# Patient Record
Sex: MALE | Race: WHITE | NOT HISPANIC OR LATINO | Employment: FULL TIME | URBAN - METROPOLITAN AREA
[De-identification: names, ages, dates, MRNs, and addresses within clinical notes are randomized per-mention and may not be internally consistent; named-entity substitution may affect disease eponyms.]

---

## 2019-03-26 ENCOUNTER — PRE VISIT (OUTPATIENT)
Dept: SLEEP MEDICINE | Facility: CLINIC | Age: 39
End: 2019-03-26

## 2019-03-26 NOTE — TELEPHONE ENCOUNTER
"  1.  Reason for the visit:  Consult to discuss snoring  2.  Referring provider and clinic name:  Self  3.  Previous Sleep Doctor or Pulmonlogist (clinic name)?  No records   4.  Records, Procedures, Imaging, and Labs (see below)  No notes        All NOTES from previous office visits that pertain to why they are being seen in the Sleep Center    Previous Sleep Studies, Chest CT, Echos and reports that pertain to why they are seeing Sleep Center    All Sleep records that have been done in the last 2 years that pertain to why they are seeing Sleep Center            Are they being seen for continuation of care for Cpap/Bipap/Avap/Trilogy/Dental Device? none    If yes to above Who and Where was Device issued/currently getting supplies from? na    Are you currently on \"Supplemental Oxygen\" during the day or night?   na                                                                                                                                                      Please remind pt to bring Cpap machine and ask to arrive 15 minutes early to appointment due traffic and congestion                                                 5. Pt Sleep Center Packet received Message left asking pt to arrive 30 minutes early to appointment if no packet received.        Yes: \"please make sure that you bring this to your appointment completed, either the doctor will not see you until this completed or you may be asked to reschedule your appointment.\"     No: mail or email to the pt and explain, \"please make sure that you bring this to your appointment completed, either the doctor will not see you until this completed or you may be asked to reschedule your appointment.\"     ~If pt coming early to fill packet out, ask that they come 30 minutes prior to their appointment~     6. Has the pt's medication list been updated and preferred pharmacy added?     7. Has the allergy list been reviewed?    \"Thank you for choosing [facility] " "and we look forward to seeing you at your upcoming appointment\"     "

## 2019-03-27 NOTE — PROGRESS NOTES
"  1.  Reason for the visit:  Consult to discuss snoring  2.  Referring provider and clinic name:  Self  3.  Previous Sleep Doctor or Pulmonlogist (clinic name)?  No records   4.  Records, Procedures, Imaging, and Labs (see below)  No notes        All NOTES from previous office visits that pertain to why they are being seen in the Sleep Center    Previous Sleep Studies, Chest CT, Echos and reports that pertain to why they are seeing Sleep Center    All Sleep records that have been done in the last 2 years that pertain to why they are seeing Sleep Center            Are they being seen for continuation of care for Cpap/Bipap/Avap/Trilogy/Dental Device? none    If yes to above Who and Where was Device issued/currently getting supplies from? na    Are you currently on \"Supplemental Oxygen\" during the day or night?   na                                                                                                                                                      Please remind pt to bring Cpap machine and ask to arrive 15 minutes early to appointment due traffic and congestion                                                 5. Pt Sleep Center Packet received Message left asking pt to arrive 30 minutes early to appointment if no packet received.        Yes: \"please make sure that you bring this to your appointment completed, either the doctor will not see you until this completed or you may be asked to reschedule your appointment.\"     No: mail or email to the pt and explain, \"please make sure that you bring this to your appointment completed, either the doctor will not see you until this completed or you may be asked to reschedule your appointment.\"     ~If pt coming early to fill packet out, ask that they come 30 minutes prior to their appointment~     6. Has the pt's medication list been updated and preferred pharmacy added?     7. Has the allergy list been reviewed?    \"Thank you for choosing [facility] " "and we look forward to seeing you at your upcoming appointment\"     CC:***    HPI:Symptoms:***        Schedule:***      Studies:***            Personal, social and Family hx:  ...Scanned in sleep consult note reviewed:    ROS      Allergies:    No Known Allergies    Medications:    No current outpatient medications on file.       Problem List:  There are no active problems to display for this patient.       Past Medical/Surgical History:  No past medical history on file.  No past surgical history on file.    No family history on file.  Social History     Socioeconomic History     Marital status:      Spouse name: Not on file     Number of children: Not on file     Years of education: Not on file     Highest education level: Not on file   Occupational History     Not on file   Social Needs     Financial resource strain: Not on file     Food insecurity:     Worry: Not on file     Inability: Not on file     Transportation needs:     Medical: Not on file     Non-medical: Not on file   Tobacco Use     Smoking status: Not on file   Substance and Sexual Activity     Alcohol use: Not on file     Drug use: Not on file     Sexual activity: Not on file   Lifestyle     Physical activity:     Days per week: Not on file     Minutes per session: Not on file     Stress: Not on file   Relationships     Social connections:     Talks on phone: Not on file     Gets together: Not on file     Attends [employer] service: Not on file     Active member of club or organization: Not on file     Attends meetings of clubs or organizations: Not on file     Relationship status: Not on file     Intimate partner violence:     Fear of current or ex partner: Not on file     Emotionally abused: Not on file     Physically abused: Not on file     Forced sexual activity: Not on file   Other Topics Concern     Not on file   Social History Narrative     Not on file       Physical Examination:  Vitals: /78   Pulse 59   Resp 16   Ht 1.727 m " "(5' 8\")   Wt 81.6 kg (180 lb)   SpO2 97%   BMI 27.37 kg/m    BMI= Body mass index is 27.37 kg/m .    Neck Cir (cm): 43 cm    [place] Total Score 3/28/2019   Total score - [place] 18   mal iv, geovanna bilat, nht, dec rul, mild cy on nails nare geovanna    {SHORT EXAM:248676}  Musculoskeletal:  Mallampati Class: {KAI NUMERAL I-IV:269192}.  Tonsillar Stage: {NUMBERS 1-4:248278}.  Dental:         Assessment/Plan:***    1.***  2. ***  3.  4.  5.        CC: No ref. provider found                      "

## 2019-03-28 ENCOUNTER — OFFICE VISIT (OUTPATIENT)
Dept: SLEEP MEDICINE | Facility: CLINIC | Age: 39
End: 2019-03-28
Payer: COMMERCIAL

## 2019-03-28 VITALS
DIASTOLIC BLOOD PRESSURE: 78 MMHG | HEIGHT: 68 IN | OXYGEN SATURATION: 97 % | SYSTOLIC BLOOD PRESSURE: 105 MMHG | RESPIRATION RATE: 16 BRPM | WEIGHT: 180 LBS | BODY MASS INDEX: 27.28 KG/M2 | HEART RATE: 59 BPM

## 2019-03-28 DIAGNOSIS — G47.21 CIRCADIAN RHYTHM SLEEP DISORDER, DELAYED SLEEP PHASE TYPE: ICD-10-CM

## 2019-03-28 DIAGNOSIS — G47.8 UNHEALTHY SLEEP HABIT: ICD-10-CM

## 2019-03-28 DIAGNOSIS — R06.83 SNORING: Primary | ICD-10-CM

## 2019-03-28 PROCEDURE — 99205 OFFICE O/P NEW HI 60 MIN: CPT | Performed by: NURSE PRACTITIONER

## 2019-03-28 ASSESSMENT — MIFFLIN-ST. JEOR: SCORE: 1705.97

## 2019-03-28 NOTE — PATIENT INSTRUCTIONS
"MY TREATMENT INFORMATION FOR SLEEP APNEA-  Popeye Garcia    NP: Olivia Leon  SLEEP CENTER : [facility]     MY CONTACT NUMBER: [phone]    Call insurance: ask cost for a home study for snoring and suspected sleep apnea  Call our price line and as same question as above  Contact us [phone] or [phone] for me with decision as to whether to go  Rise time: reduce variance to < 3 hrs throughout the week  Alcohol: not within 3-4 hrs of bedtime  flonase 2 sprays/nostril/day    Am I having a sleep study at a sleep center?  Make sure you have an appointment for the study before you leave!    Am I having a home sleep study?  Watch this video:  https://[email or web address].com/watch?v=CteI_[email or web address]&[email or web address]  Please verify your insurance coverage with your insurance carrier    Frequently asked questions:  1. What is Obstructive Sleep Apnea (ACE)? ACE is the most common type of sleep apnea. Apnea means, \"without breath.\"  Apnea is most often caused by narrowing or collapse of the upper airway as muscles relax during sleep.   Almost everyone has occasional apneas. Most people with sleep apnea have had brief interruptions at night frequently for many years.  The severity of sleep apnea is related to how frequent and severe the events are.   2. What are the consequences of ACE? Symptoms include: feeling sleepy during the day, snoring loudly, gasping or stopping of breathing, trouble sleeping, and occasionally morning headaches or heartburn at night.  Sleepiness can be serious and even increase the risk of falling asleep while driving. Other health consequences may include development of high blood pressure and other cardiovascular disease in persons who are susceptible. Untreated ACE  can contribute to heart disease, stroke and diabetes.   3. What are the treatment options? In most situations, sleep apnea is a lifelong disease that must be managed with daily therapy. Medications are " not effective for sleep apnea and surgery is generally not considered until other therapies have been tried. Your treatment is your choice . Continuous Positive Airway (CPAP) works right away and is the therapy that is effective in nearly everyone. An oral device to hold your jaw forward is usually the next most reliable option. Other options include postioning devices (to keep you off your back), weight loss, and surgery including a tongue pacing device. There is more detail about some of these options below.    Important tips for using CPAP and similar devices   Know your equipment:  CPAP is continuous positive airway pressure that prevents obstructive sleep apnea by keeping the throat from collapsing while you are sleeping. In most cases, the device is  smart  and can slowly self-adjusts if your throat collapses and keeps a record every day of how well you are treated-this information is available to you and your care team.  BPAP is bilevel positive airway pressure that keeps your throat open and also assists each breath with a pressure boost to maintain adequate breathing.  Special kinds of BPAP are used in patients who have inadequate breathing from lung or heart disease. In most cases, the device is  smart  and can slowly self-adjusts to assist breathing. Like CPAP, the device keeps a record of how well you are treated.  Your mask is your connection to the device. You get to choose what feels most comfortable and the staff will help to make sure if fits. Here: are some examples of the different masks that are available:       Key points to remember on your journey with sleep apnea:  1. Sleep study.  PAP devices often need to be adjusted during a sleep study to show that they are effective and adjusted right.  2. Good tips to remember: Try wearing just the mask during a quiet time during the day so your body adapts to wearing it. A humidifier is recommended for comfort in most cases to prevent drying of your  nose and throat. Allergy medication from your provider may help you if you are having nasal congestion.  3. Getting settled-in. It takes more than one night for most of us to get used to wearing a mask. Try wearing just the mask during a quiet time during the day so your body adapts to wearing it. A humidifier is recommended for comfort in most cases. Our team will work with you carefully on the first day and will be in contact within 4 days and again at 2 and 4 weeks for advice and remote device adjustments. Your therapy is evaluated by the device each day.   4. Use it every night. The more you are able to sleep naturally for 7-8 hours, the more likely you will have good sleep and to prevent health risks or symptoms from sleep apnea. Even if you use it 4 hours it helps. Occasionally all of us are unable to use a medical therapy, in sleep apnea, it is not dangerous to miss one night.   5. Communicate. Call our skilled team on the number provided on the first day if your visit for problems that make it difficult to wear the device. Over 2 out of 3 patients can learn to wear the device long-term with help from our team. Remember to call our team or your sleep providers if you are unable to wear the device as we may have other solutions for those who cannot adapt to mask CPAP therapy. It is recommended that you sleep your sleep provider within the first 3 months and yearly after that if you are not having problems.   Take care of your equipment. Make sure you clean your mask and tubing using directions every day and that your filter and mask are replaced as recommended or if they are not working.     BESIDES CPAP, WHAT OTHER THERAPIES ARE THERE?    Positioning Device  Positioning devices are generally used when sleep apnea is mild and only occurs on your back.This example shows a pillow that straps around the waist. It may be appropriate for those whose sleep study shows milder sleep apnea that occurs primarily when  lying flat on one's back. Preliminary studies have shown benefit but effectiveness at home may need to be verified by a home sleep test. These devices are generally not covered by medical insurance.  Examples of devices that maintain sleeping on the back to prevent snoring and mild sleep apnea.    Positioning Device  Positioning devices are generally used when sleep apnea is mild and only occurs on your back.This example shows a pillow that straps around the waist. It may be appropriate for those whose sleep study shows milder sleep apnea that occurs primarily when lying flat on one's back. Preliminary studies have shown benefit but effectiveness at home may need to be verified by a home sleep test. These devices are generally not covered by medical insurance.       [employer] or [employer] for slumber bump pillow, or backpack w/ chest strap stuffed w/ pillow;  or t shirt w/2 pockets, sew in tennis balls    Belt type body positioner  Http://[employer]/    Electronic reminder  Http://nightshifttherapy.com/  Http://www.ENDOGENX.Veritext.au/    Oral Appliance  What is oral appliance therapy?  An oral appliance device fits on your teeth at night like a retainer used after having braces. The device is made by a specialized dentist and requires several visits over 1-2 months before a manufactured device is made to fit your teeth and is adjusted to prevent your sleep apnea. Once an oral device is working properly, snoring should be improved. A home sleep test may be recommended at that time if to determine whether the sleep apnea is adequately treated.       Some things to remember:  -Oral devices are often, but not always, covered by your medical insurance. Be sure to check with your insurance provider.   -If you are referred for oral therapy, you will be given a list of specialized dentists to consider or you may choose to visit the Web site of the American Academy of Dental Sleep Medicine  -Oral devices are less likely to work if you  have severe sleep apnea or are extremely overweight.     More detailed information  An oral appliance is a small acrylic device that fits over the upper and lower teeth  (similar to a retainer or a mouth guard). This device slightly moves jaw forward, which moves the base of the tongue forward, opens the airway, improves breathing for effective treat snoring and obstructive sleep apnea in perhaps 7 out of 10 people .  The best working devices are custom-made by a dental device  after a mold is made of the teeth 1, 2, 3.  When is an oral appliance indicated?  Oral appliance therapy is recommended as a first-line treatment for patients with primary snoring, mild sleep apnea, and for patients with moderate sleep apnea who prefer appliance therapy to use of CPAP4, 5. Severity of sleep apnea is determined by sleep testing and is based on the number of respiratory events per hour of sleep.   How successful is oral appliance therapy?  The success rate of oral appliance therapy in patients with mild sleep apnea is 75-80% while in patients with moderate sleep apnea it is 50-70%. The chance of success in patients with severe sleep apnea is 40-50%. The research also shows that oral appliances have a beneficial effect on the cardiovascular health of ACE patients at the same magnitude as CPAP therapy7.  Oral appliances should be a second-line treatment in cases of severe sleep apnea, but if not completely successful then a combination therapy utilizing CPAP plus oral appliance therapy may be effective. Oral appliances tend to be effective in a broad range of patients although studies show that the patients who have the highest success are females, younger patients, those with milder disease, and less severe obesity. 3, 6.   Finding a dentist that practices dental sleep medicine  Specific training is available through the American Academy of Dental Sleep Medicine for dentists interested in working in the field of  sleep. To find a dentist who is educated in the field of sleep and the use of oral appliances, near you, visit the Web site of the American Academy of Dental Sleep Medicine.    References  1. Sree et al. Objectively measured vs self-reported compliance during oral appliance therapy for sleep-disordered breathing. Chest 2013; 144(5): 6592-9686.  2. Delfino et al. Objective measurement of compliance during oral appliance therapy for sleep-disordered breathing. Thorax 2013; 68(1): 91-96.  3. Sy et al. Mandibular advancement devices in 620 men and women with ACE and snoring: tolerability and predictors of treatment success. Chest 2004; 125: 8208-7761.  4. Juan M et al. Oral appliances for snoring and ACE: a review. Sleep 2006; 29: 244-262.  5. Sammi et al. Oral appliance treatment for ACE: an update. J Clin Sleep Med 2014; 10(2): 215-227.  6. Jayda et al. Predictors of OSAH treatment outcome. J Dent Res 2007; 86: 5215-5494.      Weight Loss:    Weight loss is a long-term strategy that may improve sleep apnea in some patients.    Weight management is a personal decision and the decision should be based on your interest and the potential benefits.  If you are interested in exploring weight loss strategies, the following discussion covers the impact on weight loss on sleep apnea and the approaches that may be successful.    Being overweight does not necessarily mean you will have health consequences.  Those who have BMI over 35 or over 27 with existing medical conditions carries greater risk.   Weight loss decreases severity of sleep apnea in most people with obesity. For those with mild obesity who have developed snoring with weight gain, even 15-30 pound weight loss can improve and occasionally eliminate sleep apnea.  Structured and life-long dietary and health habits are necessary to lose weight and keep healthier weight levels.     Though there may be significant health benefits from  weight loss, long-term weight loss is very difficult to achieve- studies show success with dietary management in less than 10% of people. In addition, substantial weight loss may require years of dietary control and may be difficult if patients have severe obesity. In these cases, surgical management may be considered.  Finally, older individuals who have tolerated obesity without health complications may be less likely to benefit from weight loss strategies.      [unfilled]    Surgery:    Surgery for obstructive sleep apnea is considered generally only when other therapies fail to work. Surgery may be discussed with you if you are having a difficult time tolerating CPAP and or when there is an abnormal structure that requires surgical correction.  Nose and throat surgeries often enlarge the airway to prevent collapse.  Most of these surgeries create pain for 1-2 weeks and up to half of the most common surgeries are not effective throughout life.  You should carefully discuss the benefits and drawbacks to surgery with your sleep provider and surgeon to determine if it is the best solution for you.   More information  Surgery for ACE is directed at areas that are responsible for narrowing or complete obstruction of the airway during sleep.  There are a wide range of procedures available to enlarge and/or stabilize the airway to prevent blockage of breathing in the three major areas where it can occur: the palate, tongue, and nasal regions.  Successful surgical treatment depends on the accurate identification of the factors responsible for obstructive sleep apnea in each person.  A personalized approach is required because there is no single treatment that works well for everyone.  Because of anatomic variation, consultation with an examination by a sleep surgeon is a critical first step in determining what surgical options are best for each patient.  In some cases, examination during sedation may be recommended in  order to guide the selection of procedures.  Patients will be counseled about risks and benefits as well as the typical recovery course after surgery. Surgery is typically not a cure for a person s ACE.  However, surgery will often significantly improve one s ACE severity (termed  success rate ).  Even in the absence of a cure, surgery will decrease the cardiovascular risk associated with OSA7; improve overall quality of life8 (sleepiness, functionality, sleep quality, etc).      Palate Procedures:  Patients with ACE often have narrowing of their airway in the region of their tonsils and uvula.  The goals of palate procedures are to widen the airway in this region as well as to help the tissues resist collapse.  Modern palate procedure techniques focus on tissue conservation and soft tissue rearrangement, rather than tissue removal.  Often the uvula is preserved in this procedure. Residual sleep apnea is common in patient after pharyngoplasty with an average reduction in sleep apnea events of 33%2.      Tongue Procedures:  ExamWhile patients are awake, the muscles that surround the throat are active and keep this region open for breathing. These muscles relax during sleep, allowing the tongue and other structures to collapse and block breathing.  There are several different tongue procedures available.  Selection of a tongue base procedure depends on characteristics seen on physical exam.  Generally, procedures are aimed at removing bulky tissues in this area or preventing the back of the tongue from falling back during sleep.  Success rates for tongue surgery range from 50-62%3.    Hypoglossal Nerve Stimulation:  Hypoglossal nerve stimulation has recently received approval from the [place] Food and Drug Administration for the treatment of obstructive sleep apnea.  This is based on research showing that the system was safe and effective in treating sleep apnea6.  Results showed that the median AHI score  decreased 68%, from 29.3 to 9.0. This therapy uses an implant system that senses breathing patterns and delivers mild stimulation to airway muscles, which keeps the airway open during sleep.  The system consists of three fully implanted components: a small generator (similar in size to a pacemaker), a breathing sensor, and a stimulation lead.  Using a small handheld remote, a patient turns the therapy on before bed and off upon awakening.    Candidates for this device must be greater than 22 years of age, have moderate to severe ACE (AHI between 20-65), BMI less than 32, have tried CPAP/oral appliance without success, and have appropriate upper airway anatomy (determined by a sleep endoscopy performed by Dr. Phillips).    Hypoglossal Nerve Stimulation Pathway:    The sleep surgeon s office will work with the patient through the insurance prior-authorization process (including communications and appeals).    Nasal Procedures:  Nasal obstruction can interfere with nasal breathing during the day and night.  Studies have shown that relief of nasal obstruction can improve the ability of some patients to tolerate positive airway pressure therapy for obstructive sleep apnea1.  Treatment options include medications such as nasal saline, topical corticosteroid and antihistamine sprays, and oral medications such as antihistamines or decongestants. Non-surgical treatments can include external nasal dilators for selected patients. If these are not successful by themselves, surgery can improve the nasal airway either alone or in combination with these other options.      Combination Procedures:  Combination of surgical procedures and other treatments may be recommended, particularly if patients have more than one area of narrowing or persistent positional disease.  The success rate of combination surgery ranges from 66-80%2,3.    References  1. Leann BEEBE. The Role of the Nose in Snoring and Obstructive Sleep Apnoea: An Update.   Eur Arch Otorhinolaryngol. 2011; 268: 1365-73.  2.  Priti SM; Kelly JA; Nely JR; Pallanch JF; Juan MB; Sulaiman SG; Ayo GRAY. Surgical modifications of the upper airway for obstructive sleep apnea in adults: a systematic review and meta-analysis. SLEEP 2010;33(10):7577-5722. Jessica MCCOY. Hypopharyngeal surgery in obstructive sleep apnea: an evidence-based medicine review.  Arch Otolaryngol Head Neck Surg. 2006 Feb;132(2):206-13.  3. Champ YH1, Apollo Y, Jaen DIONE. The efficacy of anatomically based multilevel surgery for obstructive sleep apnea. Otolaryngol Head Neck Surg. 2003 Oct;129(4):327-35.  4. Kezirian E, Goldberg A. Hypopharyngeal Surgery in Obstructive Sleep Apnea: An Evidence-Based Medicine Review. Arch Otolaryngol Head Neck Surg. 2006 Feb;132(2):206-13.  5. Irvin TORRES et al. Upper-Airway Stimulation for Obstructive Sleep Apnea.  N Engl J Med. 2014 Jan 9;370(2):139-49.  6. Pebrett Y et al. Increased Incidence of Cardiovascular Disease in Middle-aged Men with Obstructive Sleep Apnea. Am J Respir Crit Care Med; 2002 166: 159-165  7. Julia EM et al. Studying Life Effects and Effectiveness of Palatopharyngoplasty (SLEEP) study: Subjective Outcomes of Isolated Uvulopalatopharyngoplasty. Otolaryngol Head Neck Surg. 2011; 144: 623-631.              Your BMI is Body mass index is 27.37 kg/m .  Weight management is a personal decision.  If you are interested in exploring weight loss strategies, the following discussion covers the approaches that may be successful. Body mass index (BMI) is one way to tell whether you are at a healthy weight, overweight, or obese. It measures your weight in relation to your height.  A BMI of 18.5 to 24.9 is in the healthy range. A person with a BMI of 25 to 29.9 is considered overweight, and someone with a BMI of 30 or greater is considered obese. More than two-thirds of American adults are considered overweight or obese.  Being overweight or obese increases the risk for further  weight gain. Excess weight may lead to heart disease and diabetes.  Creating and following plans for healthy eating and physical activity may help you improve your health.  Weight control is part of healthy lifestyle and includes exercise, emotional health, and healthy eating habits. Careful eating habits lifelong are the mainstay of weight control. Though there are significant health benefits from weight loss, long-term weight loss with diet alone may be very difficult to achieve- studies show long-term success with dietary management in less than 10% of people. Attaining a healthy weight may be especially difficult to achieve in those with severe obesity. In some cases, medications, devices and surgical management might be considered.  What can you do?  If you are overweight or obese and are interested in methods for weight loss, you should discuss this with your provider.     Consider reducing daily calorie intake by 500 calories.     Keep a food journal.     Avoiding skipping meals, consider cutting portions instead.    Diet combined with exercise helps maintain muscle while optimizing fat loss. Strength training is particularly important for building and maintaining muscle mass. Exercise helps reduce stress, increase energy, and improves fitness. Increasing exercise without diet control, however, may not burn enough calories to loose weight.       Start walking three days a week 10-20 minutes at a time    Work towards walking thirty minutes five days a week     Eventually, increase the speed of your walking for 1-2 minutes at time    In addition, we recommend that you review healthy lifestyles and methods for weight loss available through the National Institutes of Health patient information sites:  http://win.niddk.nih.gov/publications/index.htm    And look into health and wellness programs that may be available through your health insurance provider, employer, local community center, or [employer].    Weight  management plan: Patient was referred to their PCP to discuss a diet and exercise plan.

## 2019-03-30 NOTE — PROGRESS NOTES
Visit Date:   03/28/2019      CHIEF COMPLAINT:  The patient self-refers for problems with snoring and witnessed apneas.      HISTORY OF PRESENT ILLNESS:  Mr. Garcia who prefers to be called Popeye, reports a history of snoring and apneas, and at this point in time, he has become concerned about not only his health, but actually his bed partner's health as well.  He has quit smoking.  He has lost weight.  He has changed his exercise patterns from three, to five times a week.  He has decreased his alcohol intake and yet the snoring and the apneas persist.        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  He does work Mondays and Tuesdays, 10 a.m. to 4 p.m. and on Wednesday, Thursday Fridays and Saturdays, works 4 p.m. to 10 or until 1 or 2 in the morning.  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  He indicates you need to keep in mind that they do clean up for 1-2 hours, so he is usually home by approximately 2 p.m. and in bed usually between 2 and 3.  Sleep latency is 10 minutes.  He wakes up 3-4 times a night for at least 5 minutes each, sometimes longer, due to snoring.  He is uncomfortable, falls back asleep.  He gets about 8 hours of sleep.  He does nap twice a week for an hour and a half plus, and does feel better with his naps.      Activities in bed do include some brief TV or using his phone.  He does have rare episodes of RLS.  He thinks that might be when he does have alcohol close to bedtime.  He has changed that habit and things are very infrequent.  Sometimes he does move his hands at night while he is sleeping.  He does grind his teeth on occasion, and 7 nights a week, there is loud snoring heard and witnessed apneas.  He does awaken with a dry throat, trouble breathing through his nose and admits that all things are worse on his back, where he may get elbowed to turn over.  No signs of orexin deficiency.      SOCIAL, PERSONAL, FAMILY  AND SLEEP SCALE:  He is , lives with his wife.  Sleep environment is conducive to good sleep.  Family history is positive for obstructive sleep apnea in an uncle.  Alcohol intake:  May have a drink or 2 while they close at the bar, a couple nights a week.  This is down significantly from previous use.  Denies use of marijuana.  No use of over-the-counter meds to help fall asleep.  Caffeine usually 2-3 servings.  Sometimes he is done by the afternoon, but if he closes, he may be drinking a Diet Coke until he closes up.  Does exercise.      His [place] Sleepiness Scale today is 18/24 with a 0 for stopped for a few minutes in traffic.  No near accidents or accidents due to sleepiness while driving, but sleepiness affects him on the job.  He does state that his memory is somewhat impaired, the more sleepy he gets and performance certainly is down in that case.  He is unable to say if he is troubled by tiredness or fatigue.  He actually feels sleepy.  Five to 7/30 days, struggles with anxiety.      REVIEW OF SYSTEMS:  A 14-point comprehensive review of systems completed and negative with the exception of the following:   EYES:  Some changes in vision.   LUNGS:  Shortness of breath with activity, but can do 2 flights of stairs without stopping.  He is actually fairly well-conditioned.   MENTAL HEALTH:  Anxiety as mentioned.      PAST SURGICAL HISTORY:  ACL repair.      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  He has not had PFTs that he can recall.  He has fairly good activity tolerance and has worked hard to improve that.  He also has stopped smoking.       Allergies:    No Known Allergies    Medications:    No current outpatient medications on file.       Problem List:  There are no active problems to display for this patient.       Past Medical/Surgical History:      MENTAL HEALTH:  Anxiety as mentioned.     "  PAST SURGICAL HISTORY:  ACL repair.      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  He has not had PFTs that he can recall.  He has fairly good activity tolerance and has worked hard to improve that.  He also has stopped smoking.    No family history on file.  Social History     Socioeconomic History     Marital status:      Spouse name: Not on file     Number of children: Not on file     Years of education: Not on file     Highest education level: Not on file   Occupational History     Not on file   Social Needs     Financial resource strain: Not on file     Food insecurity:     Worry: Not on file     Inability: Not on file     Transportation needs:     Medical: Not on file     Non-medical: Not on file   Tobacco Use     Smoking status: Not on file   Substance and Sexual Activity     Alcohol use: Not on file     Drug use: Not on file     Sexual activity: Not on file   Lifestyle     Physical activity:     Days per week: Not on file     Minutes per session: Not on file     Stress: Not on file   Relationships     Social connections:     Talks on phone: Not on file     Gets together: Not on file     Attends Voodoo service: Not on file     Active member of club or organization: Not on file     Attends meetings of clubs or organizations: Not on file     Relationship status: Not on file     Intimate partner violence:     Fear of current or ex partner: Not on file     Emotionally abused: Not on file     Physically abused: Not on file     Forced sexual activity: Not on file   Other Topics Concern     Not on file   Social History Narrative     Not on file       Physical Examination:  Vitals: /78   Pulse 59   Resp 16   Ht 1.727 m (5' 8\")   Wt 81.6 kg (180 lb)   SpO2 97%   BMI 27.37 kg/m    BMI= Body mass index is 27.37 kg/m .    Neck Cir (cm): 43 cm    Center Total Score 3/28/2019   Total score - Center " 18    ADRI score is 12/28.    GENERAL APPEARANCE: healthy, alert and no distress  EYES: Eyes grossly normal to inspection  HENT: oropharynx crowded, soft palate dependent, tongue base enlarged and nares congested bilaterally  NECK: thyroid normal to palpation  RESP: lungs clear to auscultation with exception of RUL(danni syndrome) - no rales, rhonchi or wheezes  CV: regular rates and rhythm, normal S1 S2, no S3 or S4 and no murmur, click or rub  Extremities are without clubbing (possible mild cyanosis of nail beds), edema  Musculoskeletal:Moves without difficulty  Mallampati Class: IV.  Tonsillar Stage: 1  hidden by pillars.  Dental: Dentition in good condition.  Good advancement of lower jaw without tmd  Skin: without facial rash     ASSESSMENT AND PLAN:  It is my impression that Mr. Garcia, at his young age, does have a moderate pretest probability for obstructive sleep apnea with his symptoms of excessive daytime sleepiness, witnessed apneas or loud snoring, and impact on mental health with some anxiety and difficulty with concentrating at work.  He does have a delayed sleep phase disorder; however, he falls asleep quickly and has reduced wakeups which certainly could be even further reduced in length by eliminating naps close to the time that he is studied.  He would prefer an HST and the following plan of care has been developed:   1.  Snoring with witnessed apneas.  STOP-Bang score of 5/8 in the setting of a 38-year-old male.  I have suggested that he consider an HST and for him to call his insurance to determine cost.  He has a large copay and just wants to assure that there are adequate funds at this point in time for him, since he will likely be paying the full price until he meets his deductible.  I have also given him the number for the price line to ask the same question.  He is to contact us at our [phone] or [phone] extensions, and leave a message with his decision as to whether to go ahead and order his  study.  We have discussed all modalities of treatment and he is open to most.  With his excellent activity tolerance, I feel there is likely a low probability that there would be any impact from his Neihart's syndrome.  Certainly with doing an HST initially, should there be a need to perform an in-lab study thereafter, then I would recommend PFTs and possibly ABGs with TCM monitoring, should we suspect that there is any significant alteration in ventilation due to his condition.   2.  Delayed sleep phase disorder.  I suggested that he minimize the difference between rise time throughout the week to improve his alertness in the morning and improved sensation of restoration of alertness and feeling less tired in the a.m.   3.  Sleep habits.  I recommend that he avoid the alcohol within 3-4 hours of bedtime and certainly caffeine as well since he gets RLS if either of those are close to bedtime as well.  In general, he has made a significant improvement in his sleep habits.      Thank you for allowing me to participate in this kind man's care.  I look forward to his response following a call to insurance providers.  Time spent with patient 60 minutes, of which greater than 50% was spent in counseling, education and coordination of care.         ROME HOLT, CNP             D: 2019   T: 2019   MT: MAGALIS      Name:     YUE KERR   MRN:      [id number]        Account:      [id number]   :      1980           Visit Date:   2019      Document: [id number]

## 2019-03-31 PROBLEM — F41.9 ANXIETY: Status: ACTIVE | Noted: 2019-03-31

## 2019-03-31 PROBLEM — Q79.8 POLAND SYNDROME: Status: ACTIVE | Noted: 2019-03-31

## 2019-03-31 PROBLEM — Z98.890 S/P ACL REPAIR: Status: ACTIVE | Noted: 2019-03-31

## 2019-05-08 ENCOUNTER — TELEPHONE (OUTPATIENT)
Dept: SLEEP MEDICINE | Facility: CLINIC | Age: 39
End: 2019-05-08

## 2019-05-08 NOTE — TELEPHONE ENCOUNTER
PT called and message left asking pt to call back to schedule HST and SAUD .    APOORVA Prince

## 2019-05-17 ENCOUNTER — TELEPHONE (OUTPATIENT)
Dept: SLEEP MEDICINE | Facility: CLINIC | Age: 39
End: 2019-05-17

## 2019-06-17 ENCOUNTER — TELEPHONE (OUTPATIENT)
Dept: SLEEP MEDICINE | Facility: CLINIC | Age: 39
End: 2019-06-17

## 2019-06-17 NOTE — TELEPHONE ENCOUNTER
Spoke with the patient and was told that he is stuck out of town for work and will not be able to come and  his HST. Per the patient he is unsure what his  Schedule will look like for the next few week and will call back once he is back in town and has a better understanding of his schedule.     After review of the chart the patient is only to be schedule for a HST. The only reason that the SAUD was added was if the patient's insurance would not cover a HST.     DO NOT RESCHEDULE PATIENT FOR BOTH SAUD AND HST.    Yesica Martin [facility]

## 2019-06-17 NOTE — TELEPHONE ENCOUNTER
Reason for Call:  Other appointment    Detailed comments: Patient is calling to cancel the sleep study for 6/18/19. Patient states he is out of town and will call back when back in town. Patient also would like call back to confirm the cancellation.     Phone Number Patient can be reached at: Home number on file [phone] (home)    Best Time: anytime     Can we leave a detailed message on this number? YES    Call taken on 6/17/2019 at 1:20 PM by Fela Javier

## 2019-07-11 ENCOUNTER — TELEPHONE (OUTPATIENT)
Dept: SLEEP MEDICINE | Facility: CLINIC | Age: 39
End: 2019-07-11

## 2019-07-12 DIAGNOSIS — G25.81 RESTLESS LEGS SYNDROME (RLS): ICD-10-CM

## 2019-07-12 DIAGNOSIS — G47.26 CIRCADIAN RHYTHM SLEEP DISORDER, SHIFT WORK TYPE: ICD-10-CM

## 2019-07-12 DIAGNOSIS — R06.83 SNORING: Primary | ICD-10-CM

## 2019-07-12 NOTE — PROGRESS NOTES
Pt who has EDS with ESS of 18/24, rare episode of RLS, occasional teeth grinding and 7 nights/wk difficulty with loud snoring and witnessed apneas, likely worse supine.  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    I left message for him to call the office,  as I'll need to speak with him regarding this to write his orders.

## 2019-07-19 RX ORDER — ZOLPIDEM TARTRATE 10 MG/1
TABLET ORAL
Qty: 1 TABLET | Refills: 0 | Status: SHIPPED | OUTPATIENT
Start: 2019-07-19 | End: 2019-08-27

## 2019-07-22 ENCOUNTER — TELEPHONE (OUTPATIENT)
Dept: SLEEP MEDICINE | Facility: CLINIC | Age: 39
End: 2019-07-22

## 2019-07-22 NOTE — TELEPHONE ENCOUNTER
Pt called and message left asking pt him to call back to schedule sleep study and verify where he wants ambien rx sent.    APOORVA Prince

## 2019-08-08 ENCOUNTER — TELEPHONE (OUTPATIENT)
Dept: SLEEP MEDICINE | Facility: CLINIC | Age: 39
End: 2019-08-08

## 2019-08-08 NOTE — TELEPHONE ENCOUNTER
Ambien prescription for sleep study has been faxed to [facility] on [address].    APOORVA Prince

## 2019-08-12 ENCOUNTER — THERAPY VISIT (OUTPATIENT)
Dept: SLEEP MEDICINE | Facility: CLINIC | Age: 39
End: 2019-08-12
Payer: COMMERCIAL

## 2019-08-12 DIAGNOSIS — R06.83 SNORING: ICD-10-CM

## 2019-08-12 PROCEDURE — 95810 POLYSOM 6/> YRS 4/> PARAM: CPT | Performed by: INTERNAL MEDICINE

## 2019-08-13 NOTE — PATIENT INSTRUCTIONS
[place] [facility]    1. Your sleep study will be reviewed by a sleep physician within the next few days.     2. Please follow up in the sleep clinic as scheduled, or, make an appointment with your sleep provider to be seen within two weeks to discuss the results of the sleep study.    3. If you have any questions or problems with your treatment plan, please contact your sleep clinic provider at [phone] to further manage your condition.    4. Please review your attached medication list, and, at your follow-up appointment advise your sleep clinic provider about any changes.    5. Go to [email or web address] for more information about your sleep problems.    Juanjose Ryan, RPSGT  August 13, 2019

## 2019-08-16 LAB — SLPCOMP: NORMAL

## 2019-08-27 ENCOUNTER — DOCUMENTATION ONLY (OUTPATIENT)
Dept: SLEEP MEDICINE | Facility: CLINIC | Age: 39
End: 2019-08-27

## 2019-08-27 ENCOUNTER — OFFICE VISIT (OUTPATIENT)
Dept: SLEEP MEDICINE | Facility: CLINIC | Age: 39
End: 2019-08-27
Payer: COMMERCIAL

## 2019-08-27 VITALS
OXYGEN SATURATION: 97 % | DIASTOLIC BLOOD PRESSURE: 71 MMHG | HEART RATE: 66 BPM | HEIGHT: 68 IN | BODY MASS INDEX: 26.98 KG/M2 | SYSTOLIC BLOOD PRESSURE: 99 MMHG | WEIGHT: 178 LBS | RESPIRATION RATE: 16 BRPM

## 2019-08-27 DIAGNOSIS — G47.33 OSA (OBSTRUCTIVE SLEEP APNEA): Primary | ICD-10-CM

## 2019-08-27 DIAGNOSIS — E66.3 OVERWEIGHT (BMI 25.0-29.9): ICD-10-CM

## 2019-08-27 PROCEDURE — 99214 OFFICE O/P EST MOD 30 MIN: CPT | Performed by: NURSE PRACTITIONER

## 2019-08-27 ASSESSMENT — MIFFLIN-ST. JEOR: SCORE: 1696.77

## 2019-08-27 NOTE — PROGRESS NOTES
Patient sleep dental referral has been sent to his chosen dentist:    Eliud Sparks DDS  [facility]  [address]  [place] [address]    P: [phone]  Fax: [phone]

## 2019-08-27 NOTE — PATIENT INSTRUCTIONS
"MY TREATMENT INFORMATION FOR SLEEP APNEA-  Popeye Garcia    DOCTOR : ROME David CNP  SLEEP CENTER :      MY CONTACT NUMBER:     Pt choose [facility] [address]  Try to keep same weight    Make positional device, wear till you have a working device  Contact me with questions or difficulties      Am I having a sleep study at a sleep center?  Make sure you have an appointment for the study before you leave!    Am I having a home sleep study?  Watch this video:  [email or web address]?v=[email or web address]_[email or web address]&[email or web address]  Please verify your insurance coverage with your insurance carrier    Frequently asked questions:  1. What is Obstructive Sleep Apnea (ACE)? ACE is the most common type of sleep apnea. Apnea means, \"without breath.\"  Apnea is most often caused by narrowing or collapse of the upper airway as muscles relax during sleep.   Almost everyone has occasional apneas. Most people with sleep apnea have had brief interruptions at night frequently for many years.  The severity of sleep apnea is related to how frequent and severe the events are.   2. What are the consequences of AEC? Symptoms include: feeling sleepy during the day, snoring loudly, gasping or stopping of breathing, trouble sleeping, and occasionally morning headaches or heartburn at night.  Sleepiness can be serious and even increase the risk of falling asleep while driving. Other health consequences may include development of high blood pressure and other cardiovascular disease in persons who are susceptible. Untreated ACE  can contribute to heart disease, stroke and diabetes.   3. What are the treatment options? In most situations, sleep apnea is a lifelong disease that must be managed with daily therapy. Medications are not effective for sleep apnea and surgery is generally not considered until other therapies have been tried. Your treatment is your choice . Continuous Positive Airway " (CPAP) works right away and is the therapy that is effective in nearly everyone. An oral device to hold your jaw forward is usually the next most reliable option. Other options include postioning devices (to keep you off your back), weight loss, and surgery including a tongue pacing device. There is more detail about some of these options below.    Important tips for using CPAP and similar devices   Know your equipment:  CPAP is continuous positive airway pressure that prevents obstructive sleep apnea by keeping the throat from collapsing while you are sleeping. In most cases, the device is  smart  and can slowly self-adjusts if your throat collapses and keeps a record every day of how well you are treated-this information is available to you and your care team.  BPAP is bilevel positive airway pressure that keeps your throat open and also assists each breath with a pressure boost to maintain adequate breathing.  Special kinds of BPAP are used in patients who have inadequate breathing from lung or heart disease. In most cases, the device is  smart  and can slowly self-adjusts to assist breathing. Like CPAP, the device keeps a record of how well you are treated.  Your mask is your connection to the device. You get to choose what feels most comfortable and the staff will help to make sure if fits. Here: are some examples of the different masks that are available:       Key points to remember on your journey with sleep apnea:  1. Sleep study.  PAP devices often need to be adjusted during a sleep study to show that they are effective and adjusted right.  2. Good tips to remember: Try wearing just the mask during a quiet time during the day so your body adapts to wearing it. A humidifier is recommended for comfort in most cases to prevent drying of your nose and throat. Allergy medication from your provider may help you if you are having nasal congestion.  3. Getting settled-in. It takes more than one night for most of  us to get used to wearing a mask. Try wearing just the mask during a quiet time during the day so your body adapts to wearing it. A humidifier is recommended for comfort in most cases. Our team will work with you carefully on the first day and will be in contact within 4 days and again at 2 and 4 weeks for advice and remote device adjustments. Your therapy is evaluated by the device each day.   4. Use it every night. The more you are able to sleep naturally for 7-8 hours, the more likely you will have good sleep and to prevent health risks or symptoms from sleep apnea. Even if you use it 4 hours it helps. Occasionally all of us are unable to use a medical therapy, in sleep apnea, it is not dangerous to miss one night.   5. Communicate. Call our skilled team on the number provided on the first day if your visit for problems that make it difficult to wear the device. Over 2 out of 3 patients can learn to wear the device long-term with help from our team. Remember to call our team or your sleep providers if you are unable to wear the device as we may have other solutions for those who cannot adapt to mask CPAP therapy. It is recommended that you sleep your sleep provider within the first 3 months and yearly after that if you are not having problems.   6. Use it for your health. We encourage use of CPAP masks during daytime quiet periods to allow your face and brain to adapt to the sensation of CPAP so that it will be a more natural sensation to awaken to at night or during naps. This can be very useful during the first few weeks or months of adapting to CPAP though it does not help medically to wear CPAP during wakefulness and  should not be used as a strategy just to meet guidelines.  7. Take care of your equipment. Make sure you clean your mask and tubing using directions every day and that your filter and mask are replaced as recommended or if they are not working.     BESIDES CPAP, WHAT OTHER THERAPIES ARE  THERE?  Positioning Device  Positioning devices are generally used when sleep apnea is mild and only occurs on your back.This example shows a pillow that straps around the waist. It may be appropriate for those whose sleep study shows milder sleep apnea that occurs primarily when lying flat on one's back. Preliminary studies have shown benefit but effectiveness at home may need to be verified by a home sleep test. These devices are generally not covered by medical insurance.    For less expensive options:           [employer] or [employer] for slumber bump pillow, or backpack w/ chest strap stuffed w/ pillow;  or t shirt w/2 pockets, sew in tennis balls  Positioning Device  Positioning devices are generally used when sleep apnea is mild and only occurs on your back.This example shows a pillow that straps around the waist. It may be appropriate for those whose sleep study shows milder sleep apnea that occurs primarily when lying flat on one's back. Preliminary studies have shown benefit but effectiveness at home may need to be verified by a home sleep test. These devices are generally not covered by medical insurance.  Examples of devices that maintain sleeping on the back to prevent snoring and mild sleep apnea.    Belt type body positioner  Http://[employer]/    Electronic reminder  Http://nightshifttherapy.com/  Http://www.[employer].com.au/      Oral Appliance  What is oral appliance therapy?  An oral appliance device fits on your teeth at night like a retainer used after having braces. The device is made by a specialized dentist and requires several visits over 1-2 months before a manufactured device is made to fit your teeth and is adjusted to prevent your sleep apnea. Once an oral device is working properly, snoring should be improved. A home sleep test may be recommended at that time if to determine whether the sleep apnea is adequately treated.       Some things to remember:  -Oral devices are often, but not always,  covered by your medical insurance. Be sure to check with your insurance provider.   -If you are referred for oral therapy, you will be given a list of specialized dentists to consider or you may choose to visit the Web site of the American Academy of Dental Sleep Medicine  -Oral devices are less likely to work if you have severe sleep apnea or are extremely overweight.     More detailed information  An oral appliance is a small acrylic device that fits over the upper and lower teeth  (similar to a retainer or a mouth guard). This device slightly moves jaw forward, which moves the base of the tongue forward, opens the airway, improves breathing for effective treat snoring and obstructive sleep apnea in perhaps 7 out of 10 people .  The best working devices are custom-made by a dental device  after a mold is made of the teeth 1, 2, 3.  When is an oral appliance indicated?  Oral appliance therapy is recommended as a first-line treatment for patients with primary snoring, mild sleep apnea, and for patients with moderate sleep apnea who prefer appliance therapy to use of CPAP4, 5. Severity of sleep apnea is determined by sleep testing and is based on the number of respiratory events per hour of sleep.   How successful is oral appliance therapy?  The success rate of oral appliance therapy in patients with mild sleep apnea is 75-80% while in patients with moderate sleep apnea it is 50-70%. The chance of success in patients with severe sleep apnea is 40-50%. The research also shows that oral appliances have a beneficial effect on the cardiovascular health of ACE patients at the same magnitude as CPAP therapy7.  Oral appliances should be a second-line treatment in cases of severe sleep apnea, but if not completely successful then a combination therapy utilizing CPAP plus oral appliance therapy may be effective. Oral appliances tend to be effective in a broad range of patients although studies show that the  patients who have the highest success are females, younger patients, those with milder disease, and less severe obesity. 3, 6.   Finding a dentist that practices dental sleep medicine  Specific training is available through the American Academy of Dental Sleep Medicine for dentists interested in working in the field of sleep. To find a dentist who is educated in the field of sleep and the use of oral appliances, near you, visit the Web site of the American Academy of Dental Sleep Medicine.    References  1. Sree, et al. Objectively measured vs self-reported compliance during oral appliance therapy for sleep-disordered breathing. Chest 2013; 144(5): 8323-4336.  2. Delfino, et al. Objective measurement of compliance during oral appliance therapy for sleep-disordered breathing. Thorax 2013; 68(1): 91-96.  3. Sy et al. Mandibular advancement devices in 620 men and women with ACE and snoring: tolerability and predictors of treatment success. Chest 2004; 125: 2515-5701.  4. Juan M, et al. Oral appliances for snoring and ACE: a review. Sleep 2006; 29: 244-262.  5. Sammi et al. Oral appliance treatment for ACE: an update. J Clin Sleep Med 2014; 10(2): 215-227.  6. Jayda et al. Predictors of OSAH treatment outcome. J Dent Res 2007; 86: 9532-3662.      Weight Loss:    Weight loss is a long-term strategy that may improve sleep apnea in some patients.    Weight management is a personal decision and the decision should be based on your interest and the potential benefits.  If you are interested in exploring weight loss strategies, the following discussion covers the impact on weight loss on sleep apnea and the approaches that may be successful.    Being overweight does not necessarily mean you will have health consequences.  Those who have BMI over 35 or over 27 with existing medical conditions carries greater risk.   Weight loss decreases severity of sleep apnea in most people with obesity. For those  with mild obesity who have developed snoring with weight gain, even 15-30 pound weight loss can improve and occasionally eliminate sleep apnea.  Structured and life-long dietary and health habits are necessary to lose weight and keep healthier weight levels.     Though there may be significant health benefits from weight loss, long-term weight loss is very difficult to achieve- studies show success with dietary management in less than 10% of people. In addition, substantial weight loss may require years of dietary control and may be difficult if patients have severe obesity. In these cases, surgical management may be considered.  Finally, older individuals who have tolerated obesity without health complications may be less likely to benefit from weight loss strategies.      [unfilled]    Surgery:    Surgery for obstructive sleep apnea is considered generally only when other therapies fail to work. Surgery may be discussed with you if you are having a difficult time tolerating CPAP and or when there is an abnormal structure that requires surgical correction.  Nose and throat surgeries often enlarge the airway to prevent collapse.  Most of these surgeries create pain for 1-2 weeks and up to half of the most common surgeries are not effective throughout life.  You should carefully discuss the benefits and drawbacks to surgery with your sleep provider and surgeon to determine if it is the best solution for you.   More information  Surgery for ACE is directed at areas that are responsible for narrowing or complete obstruction of the airway during sleep.  There are a wide range of procedures available to enlarge and/or stabilize the airway to prevent blockage of breathing in the three major areas where it can occur: the palate, tongue, and nasal regions.  Successful surgical treatment depends on the accurate identification of the factors responsible for obstructive sleep apnea in each person.  A personalized approach is  required because there is no single treatment that works well for everyone.  Because of anatomic variation, consultation with an examination by a sleep surgeon is a critical first step in determining what surgical options are best for each patient.  In some cases, examination during sedation may be recommended in order to guide the selection of procedures.  Patients will be counseled about risks and benefits as well as the typical recovery course after surgery. Surgery is typically not a cure for a person s ACE.  However, surgery will often significantly improve one s ACE severity (termed  success rate ).  Even in the absence of a cure, surgery will decrease the cardiovascular risk associated with OSA7; improve overall quality of life8 (sleepiness, functionality, sleep quality, etc).      Palate Procedures:  Patients with ACE often have narrowing of their airway in the region of their tonsils and uvula.  The goals of palate procedures are to widen the airway in this region as well as to help the tissues resist collapse.  Modern palate procedure techniques focus on tissue conservation and soft tissue rearrangement, rather than tissue removal.  Often the uvula is preserved in this procedure. Residual sleep apnea is common in patient after pharyngoplasty with an average reduction in sleep apnea events of 33%2.      Tongue Procedures:  ExamWhile patients are awake, the muscles that surround the throat are active and keep this region open for breathing. These muscles relax during sleep, allowing the tongue and other structures to collapse and block breathing.  There are several different tongue procedures available.  Selection of a tongue base procedure depends on characteristics seen on physical exam.  Generally, procedures are aimed at removing bulky tissues in this area or preventing the back of the tongue from falling back during sleep.  Success rates for tongue surgery range from 50-62%3.    Hypoglossal Nerve  Stimulation:  Hypoglossal nerve stimulation has recently received approval from the [place] Food and Drug Administration for the treatment of obstructive sleep apnea.  This is based on research showing that the system was safe and effective in treating sleep apnea6.  Results showed that the median AHI score decreased 68%, from 29.3 to 9.0. This therapy uses an implant system that senses breathing patterns and delivers mild stimulation to airway muscles, which keeps the airway open during sleep.  The system consists of three fully implanted components: a small generator (similar in size to a pacemaker), a breathing sensor, and a stimulation lead.  Using a small handheld remote, a patient turns the therapy on before bed and off upon awakening.    Candidates for this device must be greater than 22 years of age, have moderate to severe ACE (AHI between 20-65), BMI less than 32, have tried CPAP/oral appliance without success, and have appropriate upper airway anatomy (determined by a sleep endoscopy performed by Dr. Phillips).    Hypoglossal Nerve Stimulation Pathway:    The sleep surgeon s office will work with the patient through the insurance prior-authorization process (including communications and appeals).    Nasal Procedures:  Nasal obstruction can interfere with nasal breathing during the day and night.  Studies have shown that relief of nasal obstruction can improve the ability of some patients to tolerate positive airway pressure therapy for obstructive sleep apnea1.  Treatment options include medications such as nasal saline, topical corticosteroid and antihistamine sprays, and oral medications such as antihistamines or decongestants. Non-surgical treatments can include external nasal dilators for selected patients. If these are not successful by themselves, surgery can improve the nasal airway either alone or in combination with these other options.      Combination Procedures:  Combination of surgical  procedures and other treatments may be recommended, particularly if patients have more than one area of narrowing or persistent positional disease.  The success rate of combination surgery ranges from 66-80%2,3.    References  1. Leann BEEBE. The Role of the Nose in Snoring and Obstructive Sleep Apnoea: An Update.  Eur Arch Otorhinolaryngol. 2011; 268: 1365-73.  2.  Priti SM; Kelly JA; Nely JR; Pallanch JF; Juan MB; Sulaiman SG; Ayo GRAY. Surgical modifications of the upper airway for obstructive sleep apnea in adults: a systematic review and meta-analysis. SLEEP 2010;33(10):7999-8903. Jessica MCCOY. Hypopharyngeal surgery in obstructive sleep apnea: an evidence-based medicine review.  Arch Otolaryngol Head Neck Surg. 2006 Feb;132(2):206-13.  3. Champ YH1, Apollo Y, Jean DIONE. The efficacy of anatomically based multilevel surgery for obstructive sleep apnea. Otolaryngol Head Neck Surg. 2003 Oct;129(4):327-35.  4. Kezirian E, Goldberg A. Hypopharyngeal Surgery in Obstructive Sleep Apnea: An Evidence-Based Medicine Review. Arch Otolaryngol Head Neck Surg. 2006 Feb;132(2):206-13.  5. Irvin TORRES et al. Upper-Airway Stimulation for Obstructive Sleep Apnea.  N Engl J Med. 2014 Jan 9;370(2):139-49.  6. Joanne Y et al. Increased Incidence of Cardiovascular Disease in Middle-aged Men with Obstructive Sleep Apnea. Am J Respir Crit Care Med; 2002 166: 159-165  7. Dumont EM et al. Studying Life Effects and Effectiveness of Palatopharyngoplasty (SLEEP) study: Subjective Outcomes of Isolated Uvulopalatopharyngoplasty. Otolaryngol Head Neck Surg. 2011; 144: 623-631.              Your BMI is Body mass index is 27.07 kg/m .  Weight management is a personal decision.  If you are interested in exploring weight loss strategies, the following discussion covers the approaches that may be successful. Body mass index (BMI) is one way to tell whether you are at a healthy weight, overweight, or obese. It measures your weight in relation to  your height.  A BMI of 18.5 to 24.9 is in the healthy range. A person with a BMI of 25 to 29.9 is considered overweight, and someone with a BMI of 30 or greater is considered obese. More than two-thirds of American adults are considered overweight or obese.  Being overweight or obese increases the risk for further weight gain. Excess weight may lead to heart disease and diabetes.  Creating and following plans for healthy eating and physical activity may help you improve your health.  Weight control is part of healthy lifestyle and includes exercise, emotional health, and healthy eating habits. Careful eating habits lifelong are the mainstay of weight control. Though there are significant health benefits from weight loss, long-term weight loss with diet alone may be very difficult to achieve- studies show long-term success with dietary management in less than 10% of people. Attaining a healthy weight may be especially difficult to achieve in those with severe obesity. In some cases, medications, devices and surgical management might be considered.  What can you do?  If you are overweight or obese and are interested in methods for weight loss, you should discuss this with your provider.     Consider reducing daily calorie intake by 500 calories.     Keep a food journal.     Avoiding skipping meals, consider cutting portions instead.    Diet combined with exercise helps maintain muscle while optimizing fat loss. Strength training is particularly important for building and maintaining muscle mass. Exercise helps reduce stress, increase energy, and improves fitness. Increasing exercise without diet control, however, may not burn enough calories to loose weight.       Start walking three days a week 10-20 minutes at a time    Work towards walking thirty minutes five days a week     Eventually, increase the speed of your walking for 1-2 minutes at time    In addition, we recommend that you review healthy lifestyles and  methods for weight loss available through the National Institutes of Health patient information sites:  http://win.niddk.nih.gov/publications/index.htm    And look into health and wellness programs that may be available through your health insurance provider, employer, local community center, or [employer].    Weight management plan: Patient was referred to their PCP to discuss a diet and exercise plan.

## 2019-08-27 NOTE — PROGRESS NOTES
"    HPI: Referred by self  Baseline symptoms: EDS with ESS of 18/24, rare episode of RLS, occasional teeth grinding and 7 nights/wk difficulty with loud snoring and witnessed apneas, likely worse supine.   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    Cormorbid conditions: Keeley syndrome,        8/12/19 PSG Revealed [facility] Sleep Study (180.0 lbs) - AHI 14.3, RDI 18.4, Supine AHI 89.5, REM AHI 9.9, Low O2 80.8%, Time Spent ?88% 5.8 minutes / Time Spent ?89% 8.4 minutes. Increase supine sleep at home may increase degree of hypoxemia. Bruxism was heard    Treatment options discussed CPAP, MAD, positional device.  Maintaining same weight is important    Barriers to treatment prefers to consider an MAD, understands that he may need a positional device in addition to the MAD with the significant positional impact on his mild ACE with hypoxemia    Preferences MAD he has chosen to see Dr. Alberto on at [address] [place] [address]    VS:   Allergies:    No Known Allergies    Medications:    No current outpatient medications on file.       Problem List:  Patient Active Problem List    Diagnosis Date Noted     S/P ACL repair 03/31/2019     Priority: Medium     Anxiety 03/31/2019     Priority: Medium     [place] syndrome 03/31/2019     Priority: Medium        Past Medical/Surgical History:  History reviewed. No pertinent past medical history.  History reviewed. No pertinent surgical history.        Physical Examination:  Vitals: BP 99/71   Pulse 66   Resp 16   Ht 1.727 m (5' 7.99\")   Wt 80.7 kg (178 lb)   SpO2 97%   BMI 27.07 kg/m    BMI= Body mass index is 27.07 kg/m .         [place] Total Score 8/27/2019   Total score - [place] 9       GENERAL APPEARANCE: healthy, alert and no distress    ASSESSMENT/PLAN: Mild obstructive sleep apnea with a strong positional component mild hypoxemia when supine only possibly related to his " Keeley syndrome.    1.  ACE: MAD and possible need for positional device, patient should follow-up in about 4 months     2.  Overweight: Encouraged patient to maintain a reasonable weight    .

## 2019-09-26 ENCOUNTER — TELEPHONE (OUTPATIENT)
Dept: SLEEP MEDICINE | Facility: CLINIC | Age: 39
End: 2019-09-26

## 2019-09-26 NOTE — TELEPHONE ENCOUNTER
Pt called back to get clarification on dental referral.  Pt is to call number off of list provided.  If he doesn't have the list message left that we can mail or email him a copy of the referral list for dental.      APOORVA Prince

## 2019-09-26 NOTE — TELEPHONE ENCOUNTER
Pt called back stating a dental referral was sent over for him, but he has not heard from the clinic. He would like to check the status and asked for a call back.     Susanne Lowe MA on 9/26/2019 at 2:31 PM

## 2019-11-26 ENCOUNTER — DOCUMENTATION ONLY (OUTPATIENT)
Dept: SLEEP MEDICINE | Facility: CLINIC | Age: 39
End: 2019-11-26

## 2019-11-26 DIAGNOSIS — G47.34 NOCTURNAL HYPOXEMIA: ICD-10-CM

## 2019-11-26 DIAGNOSIS — G47.33 OSA (OBSTRUCTIVE SLEEP APNEA): Primary | ICD-10-CM

## 2019-11-26 NOTE — PROGRESS NOTES
Left vm, pt will need to route script to cpap on own, as is his choosing.      Maybe internet, other dme.

## 2019-12-03 ENCOUNTER — DOCUMENTATION ONLY (OUTPATIENT)
Dept: SLEEP MEDICINE | Facility: CLINIC | Age: 39
End: 2019-12-03
Payer: COMMERCIAL

## 2019-12-03 NOTE — PROGRESS NOTES
Patient was offered choice of vendor and chose [facility].  Patient Popeye Garcia was set up at [place] on December 3, 2019. Patient received a Resmed AirSense 10 Auto. Pressures were set at 5-15 cm H2O.   Patient s ramp is 5 cm H2O for Auto and FLEX/EPR is 2.  Patient received a Resmed Mask name: [id number]  Nasal mask size Medium, heated tubing and heated humidifier.  Patient is enrolled in the STM Program and does need to meet compliance. PT DOES NOT NEED COMPLIANCE WITH USAGE ONLY.   Ariela Harmon

## 2019-12-06 ENCOUNTER — DOCUMENTATION ONLY (OUTPATIENT)
Dept: SLEEP MEDICINE | Facility: CLINIC | Age: 39
End: 2019-12-06

## 2019-12-06 NOTE — PROGRESS NOTES
3 DAY STM VISIT    Diagnostic AHI: 14.3 PSG    Patient contacted for 3 day STM visit  Message left for patient to return call    Replacement device: No  STM ordered by provider: Yes     Device type: Auto-CPAP  PAP settings from order::  CPAP min 5 cm  H20       CPAP max 15 cm  H20  Mask type:    Nasal Mask     Device settings from machine      Min CPAP 5.0            Max CPAP 15.0      Assessment: Nightly usage, most nights over four hours   Action plan: Patient to have 14 day STM visit. Patient has a follow up visit scheduled:   no.    Total time spent on accessing, reviewing and interpreting remote patient PAP therapy data:   10 minutes      Total time spent with direct patient communication :   1 minutes

## 2019-12-18 ENCOUNTER — DOCUMENTATION ONLY (OUTPATIENT)
Dept: SLEEP MEDICINE | Facility: CLINIC | Age: 39
End: 2019-12-18

## 2019-12-18 NOTE — PROGRESS NOTES
14  DAY STM VISIT    Diagnostic AHI: 14.3 PSG    Message left for patient to return call     Assessment: Pt meeting objective benchmarks.       Action plan: waiting for patient to return call.  and pt to have 30 day STM visit.      Device type: Auto-CPAP    PAP settings: CPAP min 5.0 cm  H20       CPAP max 15.0 cm  H20      95th% pressure 9.8 cm  H20     Mask type:  Nasal Mask    Objective measures: 14 day rolling measures      Compliance  85 %      Leak  20.1 lpm  last  upload      AHI 0.71   last  upload      Average number of minutes 377      Objective measure goal  Compliance   Goal >70%  Leak   Goal < 24 lpm  AHI  Goal < 5  Usage  Goal >240      Total time spent on accessing, reviewing and interpreting remote patient PAP therapy data:   10 minutes      Total time spent with direct patient communication :   1 minutes

## 2020-01-03 ENCOUNTER — DOCUMENTATION ONLY (OUTPATIENT)
Dept: SLEEP MEDICINE | Facility: CLINIC | Age: 40
End: 2020-01-03
Payer: COMMERCIAL

## 2020-01-03 NOTE — PROGRESS NOTES
30 DAY STM VISIT    Diagnostic AHI: 14.3 PSG    Subjective measures:   Pt states things are going well and has no issues or complaints.  Pt is benefiting from therapy.    Assessment: Pt meeting objective benchmarks.  Patient meeting subjective benchmarks.   Action plan: pt to have 6 month STM visit  Patient has scheduled a follow up visit with Olivia Leon CNP on 2/25/20.   Device type: Auto-CPAP  PAP settings: CPAP min 5.0 cm  H20     CPAP max 15.0 cm  H20    95th% pressure 11.2 cm  H20   Mask type:  Nasal Mask  Objective measures: 14 day rolling measures      Compliance  78 %      Leak  18.74 lpm  last  upload      AHI 0.44   last  upload      Average number of minutes 388      Objective measure goal  Compliance   Goal >70%  Leak   Goal < 24 lpm  AHI  Goal < 5  Usage  Goal >240         Total time spent on accessing, reviewing and interpreting remote patient PAP therapy data:   10 minutes      Total time spent with direct patient communication :   4 minutes    Total time spent on  remote patient monitoring analysis for last 30 days:   30 min

## 2020-03-11 ENCOUNTER — HEALTH MAINTENANCE LETTER (OUTPATIENT)
Age: 40
End: 2020-03-11

## 2020-04-05 NOTE — PROGRESS NOTES
"Popeye Garcia is a 39 year old male who is being evaluated via a billable telephone visit.      The patient has been notified of following:     \"This telephone visit will be conducted via a call between you and your physician/provider. We have found that certain health care needs can be provided without the need for a physical exam.  This service lets us provide the care you need with a short phone conversation.  If a prescription is necessary we can send it directly to your pharmacy.  If lab work is needed we can place an order for that and you can then stop by our lab to have the test done at a later time.    If during the course of the call the physician/provider feels a telephone visit is not appropriate, you will not be charged for this service.\"     Patient has given verbal consent for Telephone visit?  Yes    Popeye Garcia complains of  No chief complaint on file.      I have reviewed and updated the patient's Past Medical History, Social History, Family History and Medication List.    ALLERGIES  Patient has no known allergies.    Additional provider notes: insert own note template here see below    Phone call duration: 25 minutes    ROME David CNP           CC: follow up compliance and response to cpap for mild harshil  HPI: Referred by selfBaseline symptoms: EDS with ESS of 18/24, rare episode of RLS, occasional teeth grinding and 7 nights/wk difficulty with loud snoring and witnessed apneas, likely worse supine. 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 Increase supine sleep at home may increase degree of hypoxemia. Bruxism was " "heard    DME: [facility]     Overall, he rates the experience with PAP as 9 (0 poor, 10 great). The mask is comfortable usually The mask is uncomfortable because of \"sore nasal bridge initially-better now\".  The mask is not leaking   He is occasionally snoring with the mask on-occasionally need to roll over.  He is not having gasp arousals.  He is having significant oral/nasal dryness-mouth dryness at times. The pressure is comfortable.     2yrs and 4 months free of nicotine, likely seasonal allergies with difficulty breathing through nose at night.    His PAP interface is Nasal Mask-leak at sides of mouth with dropping of mouth open slightly    Sleep schedule with Covid 19 isolation: regular  Bedtime is typically 1200. Usually it takes about  minutes to fall asleep with the mask on. Wake time is typically 8:-8:30A.  Patient is using PAP therapy 6 or hours per night. The patient is usually getting 7-8 hours of sleep per night.    Inadvertent miss of cpap use: Couch fall asleep 1/7 maybe.  Before when working late hours was more often with winding down outside of bedtime., now adjusting to sleeping in bed. bedpartner come to him to have him move to the bed if he did fall asleep on couch.  now maybe 1/7nights and she'll wakeup him up and have him move.     Auto-PAP 5.0 - 15.0 cmH2O 30 day usage data:    66% of days with > 4 hours of use. 8/30 days with no use.   Average use 327 minutes per day.   95%ile Leak 19.2 L/min.   CPAP 95% pressure 11.8 cm.   AHI 0.52 events per hour.     He does feel rested in the morning.    Allergies:    No Known Allergies    Medications:    No current outpatient medications on file.       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  No past surgical history " "on file.        Physical Examination:  Vitals: Ht 1.727 m (5' 8\")   Wt 80.7 kg (178 lb)   BMI 27.06 kg/m    BMI= Body mass index is 27.06 kg/m .         [place] Total Score 4/6/2020   Total score - [place] 5   Previously 18/24 (shift work and untreated ahrshil likely)-much better and most noted if he misses use    GENERAL tone and content of telephone visit:, alert and no distress, better rested.    A/p  1. harshil  Recheck hardy (when able) , to check for durability of coverage of hypoxemia with current therapy (and with sleeping in all positions-should get some supine sleep) with dx of San[place] syndrome. Follow up in 1 year, reviewed his renewal of supplies  2.  Rhinitis: .saline otc recommended  3. Sleep habits: getting better, recommend set alarm on phone  4. Sanford syndrome: see #1 above.    Time spent with patient is 25 minutes, of which >50% was spent in counseling, education and coordination of care.          "

## 2020-04-06 ENCOUNTER — VIRTUAL VISIT (OUTPATIENT)
Dept: SLEEP MEDICINE | Facility: CLINIC | Age: 40
End: 2020-04-06
Payer: COMMERCIAL

## 2020-04-06 VITALS — WEIGHT: 178 LBS | BODY MASS INDEX: 26.98 KG/M2 | HEIGHT: 68 IN

## 2020-04-06 DIAGNOSIS — G47.33 OSA (OBSTRUCTIVE SLEEP APNEA): Primary | ICD-10-CM

## 2020-04-06 DIAGNOSIS — Q79.8 POLAND SYNDROME: ICD-10-CM

## 2020-04-06 DIAGNOSIS — J31.0 RHINITIS, UNSPECIFIED TYPE: ICD-10-CM

## 2020-04-06 DIAGNOSIS — G47.8 UNHEALTHY SLEEP HABIT: ICD-10-CM

## 2020-04-06 PROCEDURE — 99214 OFFICE O/P EST MOD 30 MIN: CPT | Mod: TEL | Performed by: NURSE PRACTITIONER

## 2020-04-06 ASSESSMENT — MIFFLIN-ST. JEOR: SCORE: 1691.9

## 2020-04-06 NOTE — PATIENT INSTRUCTIONS
"Overnight oximetry: will contact you to schedule when these procedures are being done again    Rhinitis: Use saline spray product (ocean complete or arm and hammer are easy to use) in shower where nose naturally opens up. Use another time of day with continue congestion over the sink.  For more nights of all sleep on cpap, may want to consider setting an alarm when sitting down to relax: \" for when you want to be in bed on cpap\".  May save your bedpartner from less restorative sleep while listening to hear if snoring is starting up elsewhere      Until you are seen again in clinic in the next 12 months, please watch for a return of sleepiness. An increase in sleepiness while using cpap for obstructive sleep apnea is usually due to a change in the followin) a decrease in usage of cpap  2) decrease in amount of time slept  3) a poor seal (often a function of not following cleaning recommendations or  replacement guidelines)  4)  increase in weight or use of sedating medications resulting in higher pressure needs     If you have tried to address these issues but are still sleepy, please call for an earlier appointment.    Please, do not drive if sleepy    Your BMI is Body mass index is 27.06 kg/m .  Weight management is a personal decision.  If you are interested in exploring weight loss strategies, the following discussion covers the approaches that may be successful. Body mass index (BMI) is one way to tell whether you are at a healthy weight, overweight, or obese. It measures your weight in relation to your height.  A BMI of 18.5 to 24.9 is in the healthy range. A person with a BMI of 25 to 29.9 is considered overweight, and someone with a BMI of 30 or greater is considered obese. More than two-thirds of American adults are considered overweight or obese.  Being overweight or obese increases the risk for further weight gain. Excess weight may lead to heart disease and diabetes.  Creating and following plans " for healthy eating and physical activity may help you improve your health.  Weight control is part of healthy lifestyle and includes exercise, emotional health, and healthy eating habits. Careful eating habits lifelong are the mainstay of weight control. Though there are significant health benefits from weight loss, long-term weight loss with diet alone may be very difficult to achieve- studies show long-term success with dietary management in less than 10% of people. Attaining a healthy weight may be especially difficult to achieve in those with severe obesity. In some cases, medications, devices and surgical management might be considered.  What can you do?  If you are overweight or obese and are interested in methods for weight loss, you should discuss this with your provider.     Consider reducing daily calorie intake by 500 calories.     Keep a food journal.     Avoiding skipping meals, consider cutting portions instead.    Diet combined with exercise helps maintain muscle while optimizing fat loss. Strength training is particularly important for building and maintaining muscle mass. Exercise helps reduce stress, increase energy, and improves fitness. Increasing exercise without diet control, however, may not burn enough calories to loose weight.       Start walking three days a week 10-20 minutes at a time    Work towards walking thirty minutes five days a week     Eventually, increase the speed of your walking for 1-2 minutes at time    In addition, we recommend that you review healthy lifestyles and methods for weight loss available through the National Institutes of Health patient information sites:  http://win.niddk.nih.gov/publications/index.htm    And look into health and wellness programs that may be available through your health insurance provider, employer, local community center, or jagjit club.    Weight management plan: Patient was referred to their PCP to discuss a diet and exercise plan.

## 2021-01-03 ENCOUNTER — HEALTH MAINTENANCE LETTER (OUTPATIENT)
Age: 41
End: 2021-01-03

## 2021-02-16 VITALS — HEIGHT: 68 IN | BODY MASS INDEX: 26.98 KG/M2 | WEIGHT: 178 LBS

## 2021-02-16 ASSESSMENT — MIFFLIN-ST. JEOR: SCORE: 1686.9

## 2021-02-16 NOTE — PROGRESS NOTES
Popeye is a 40 year old who is being evaluated via a billable video visit.      How would you like to obtain your AVS? MyChart  If the video visit is dropped, the invitation should be resent by: Send to e-mail at: [email or web address]  Will anyone else be joining your video visit? No      Video Start Time: 1 PM  Video-Visit Details    Type of service:  Video Visit    Video End Time:1:30 PM    Originating Location (pt. Location): Home    Distant Location (provider location):  [facility] SLEEP [place] [email or web address]     Platform used for Video Visit: Zeel rooming notes:          [facility]   Outpatient Sleep Medicine Follow-up Visit  February 17, 2021    Name: Popeye Garcia MRN# [id number]   Age: 40 year old YOB: 1980     Date of Consultation: February 17, 2021  Consultation is requested by: No referring provider defined for this encounter.  Primary care provider: [facility]           Assessment and Plan:     Sleep Diagnoses:    Mild positional obstructive sleep apnea with hypoxemia currently on auto titration CPAP with average use 2 hours daily and residual AHI of 1      Summary Counseling:  If you experience intolerance to your new mask or excessive leaking, return of snoring or sleep disruption, schedule virtual visit for next steps. Otherwise return to clinic in 1 year.         Problem list:     Patient Active Problem List   Diagnosis     S/P ACL repair     Anxiety     Keeley syndrome    (congenital unilateral shoulder muscles         History of Present Illness:     Popeye Garcia is a 40 year old male with extremely positional obstructive sleep apnea who feels that he has had benefit from CPAP but he has had been recently bothered by noisy oral leak with recurrent awakenings and return of sleep disruption.  This is interfering with current use with decreased adherence despite the well-controlled disease with AHI of less than 1.  He is requesting a  full facemask replacement.  He has a full rubio however it is not sufficiently bulky to prevent seal at least by appearance today.  He prefers not to use a chinstrap.    PREVIOUS SLEEP STUDIES:  12/19 PSG Revealed [facility] Diagnostic Sleep Study (180.0 lbs) - AHI 14.3, RDI 18.4, Supine AHI 89.5, REM AHI 9.9, Low O2 80.8%, Time Spent ?88% 5.8 minutes / Time Spent ?89% 8.4 minutes. Increase supine sleep at home may increase degree of hypoxemia. Bruxism was heard      His PAP interface is Nasal Pillows-we will switch to a full facemask          Objective:  CPAP Compliance Targets:   >70% days > 4 hours AHI < 5   30 days ending February 17, 2021   ResMed   Auto-PAP 5.0 - 15.0 cmH2O 30 day usage data:    30% of days with > 4 hours of use. 20/30 days with no use.   Average use 123 minutes per day.   95%ile Leak 20.84 L/min.   CPAP 95% pressure 12.2 cm.   AHI 1.03 events per hour.                      Medications:     No current outpatient medications on file.     No current facility-administered medications for this visit.         No Known Allergies         Problem List:     Patient Active Problem List   Diagnosis     S/P ACL repair     Anxiety     [place] syndrome            Past Medical History:     Does not need 02 supplement at night   No past medical history on file.          Past Surgical History:    No h/o  upper airway surgery  No past surgical history on file.           Physical Examination:   Objective:  Mandibular profile  Reported vitals:  There were no vitals taken for this visit.   GENERAL: Healthy, alert and no distress  EYES: Eyes grossly normal to inspection.  No discharge or erythema, or obvious scleral/conjunctival abnormalities.  RESP: No audible wheeze, cough, or visible cyanosis.  No visible retractions or increased work of breathing.    SKIN: Visible skin clear. No significant rash, abnormal pigmentation or lesions.  NEURO: Cranial nerves grossly intact.  Mentation and speech appropriate for  age.  PSYCH: Mentation appears normal, affect normal/bright, judgement and insight intact, normal speech and appearance well-groomed.        Copy to: Clinic, [facility]      FIDENCIO LEES MD 2/17/2021       Total time spent with patient: 20 min >50% counseling and 15 minutes documenting and reviewing records

## 2021-02-16 NOTE — PATIENT INSTRUCTIONS
Your BMI is Body mass index is 27.06 kg/m .  Weight management is a personal decision.  If you are interested in exploring weight loss strategies, the following discussion covers the approaches that may be successful. Body mass index (BMI) is one way to tell whether you are at a healthy weight, overweight, or obese. It measures your weight in relation to your height.  A BMI of 18.5 to 24.9 is in the healthy range. A person with a BMI of 25 to 29.9 is considered overweight, and someone with a BMI of 30 or greater is considered obese. More than two-thirds of American adults are considered overweight or obese.  Being overweight or obese increases the risk for further weight gain. Excess weight may lead to heart disease and diabetes.  Creating and following plans for healthy eating and physical activity may help you improve your health.  Weight control is part of healthy lifestyle and includes exercise, emotional health, and healthy eating habits. Careful eating habits lifelong are the mainstay of weight control. Though there are significant health benefits from weight loss, long-term weight loss with diet alone may be very difficult to achieve- studies show long-term success with dietary management in less than 10% of people. Attaining a healthy weight may be especially difficult to achieve in those with severe obesity. In some cases, medications, devices and surgical management might be considered.  What can you do?  If you are overweight or obese and are interested in methods for weight loss, you should discuss this with your provider.     Consider reducing daily calorie intake by 500 calories.     Keep a food journal.     Avoiding skipping meals, consider cutting portions instead.    Diet combined with exercise helps maintain muscle while optimizing fat loss. Strength training is particularly important for building and maintaining muscle mass. Exercise helps reduce stress, increase energy, and improves fitness.  Increasing exercise without diet control, however, may not burn enough calories to loose weight.       Start walking three days a week 10-20 minutes at a time    Work towards walking thirty minutes five days a week     Eventually, increase the speed of your walking for 1-2 minutes at time    In addition, we recommend that you review healthy lifestyles and methods for weight loss available through the National Institutes of Health patient information sites:  http://win.niddk.nih.gov/publications/index.htm    And look into health and wellness programs that may be available through your health insurance provider, employer, local community center, or jagjit club.

## 2021-02-17 ENCOUNTER — VIRTUAL VISIT (OUTPATIENT)
Dept: SLEEP MEDICINE | Facility: CLINIC | Age: 41
End: 2021-02-17
Payer: COMMERCIAL

## 2021-02-17 DIAGNOSIS — G47.33 OSA (OBSTRUCTIVE SLEEP APNEA): Primary | ICD-10-CM

## 2021-02-17 PROCEDURE — 99214 OFFICE O/P EST MOD 30 MIN: CPT | Mod: GT | Performed by: INTERNAL MEDICINE

## 2021-02-23 ENCOUNTER — DOCUMENTATION ONLY (OUTPATIENT)
Dept: SLEEP MEDICINE | Facility: CLINIC | Age: 41
End: 2021-02-23

## 2021-02-24 ENCOUNTER — TELEPHONE (OUTPATIENT)
Dept: SLEEP MEDICINE | Facility: CLINIC | Age: 41
End: 2021-02-24

## 2021-02-24 NOTE — TELEPHONE ENCOUNTER
Patient came to [facility] for mask fitting appointment on February 24, 2021. Patient requested to switch masks because oral breathing. Discussed the following masks: F30I,F20,VITERA Patient selected a Resmed Mask name: [id number] IS GOING TO TRY THE AIRFIT CUSHION. PT UNDERSTANDS THAT WITH FACIAL HAIR IT CAN BE DIFFICULT TO GET A GOOD SEAL AND WILL TRY AIRTOUCH IF NEED BE.0 Full Face mask size Small.

## 2021-04-25 ENCOUNTER — HEALTH MAINTENANCE LETTER (OUTPATIENT)
Age: 41
End: 2021-04-25

## 2021-10-10 ENCOUNTER — HEALTH MAINTENANCE LETTER (OUTPATIENT)
Age: 41
End: 2021-10-10

## 2022-05-21 ENCOUNTER — HEALTH MAINTENANCE LETTER (OUTPATIENT)
Age: 42
End: 2022-05-21

## 2022-09-18 ENCOUNTER — HEALTH MAINTENANCE LETTER (OUTPATIENT)
Age: 42
End: 2022-09-18

## 2023-02-09 ENCOUNTER — OFFICE VISIT (OUTPATIENT)
Dept: FAMILY MEDICINE | Facility: CLINIC | Age: 43
End: 2023-02-09
Payer: COMMERCIAL

## 2023-02-09 VITALS
HEIGHT: 69 IN | TEMPERATURE: 97.3 F | OXYGEN SATURATION: 96 % | HEART RATE: 58 BPM | SYSTOLIC BLOOD PRESSURE: 123 MMHG | DIASTOLIC BLOOD PRESSURE: 79 MMHG | BODY MASS INDEX: 28.73 KG/M2 | WEIGHT: 194 LBS

## 2023-02-09 DIAGNOSIS — Z13.220 SCREENING FOR HYPERLIPIDEMIA: ICD-10-CM

## 2023-02-09 DIAGNOSIS — Z13.228 SCREENING FOR ENDOCRINE, NUTRITIONAL, METABOLIC AND IMMUNITY DISORDER: ICD-10-CM

## 2023-02-09 DIAGNOSIS — Z13.0 SCREENING FOR ENDOCRINE, NUTRITIONAL, METABOLIC AND IMMUNITY DISORDER: ICD-10-CM

## 2023-02-09 DIAGNOSIS — Z11.4 SCREENING FOR HIV (HUMAN IMMUNODEFICIENCY VIRUS): ICD-10-CM

## 2023-02-09 DIAGNOSIS — Q79.8 POLAND SYNDROME: ICD-10-CM

## 2023-02-09 DIAGNOSIS — Z13.29 SCREENING FOR ENDOCRINE, NUTRITIONAL, METABOLIC AND IMMUNITY DISORDER: ICD-10-CM

## 2023-02-09 DIAGNOSIS — Z13.21 SCREENING FOR ENDOCRINE, NUTRITIONAL, METABOLIC AND IMMUNITY DISORDER: ICD-10-CM

## 2023-02-09 DIAGNOSIS — Z00.00 VISIT FOR PREVENTIVE HEALTH EXAMINATION: Primary | ICD-10-CM

## 2023-02-09 DIAGNOSIS — F41.9 ANXIETY: ICD-10-CM

## 2023-02-09 DIAGNOSIS — Z11.59 NEED FOR HEPATITIS C SCREENING TEST: ICD-10-CM

## 2023-02-09 LAB
ALBUMIN SERPL BCG-MCNC: 4.4 G/DL (ref 3.5–5.2)
ALP SERPL-CCNC: 44 U/L (ref 40–129)
ALT SERPL W P-5'-P-CCNC: 25 U/L (ref 10–50)
ANION GAP SERPL CALCULATED.3IONS-SCNC: 11 MMOL/L (ref 7–15)
AST SERPL W P-5'-P-CCNC: 20 U/L (ref 10–50)
BILIRUB SERPL-MCNC: 0.3 MG/DL
BUN SERPL-MCNC: 16.5 MG/DL (ref 6–20)
CALCIUM SERPL-MCNC: 9.2 MG/DL (ref 8.6–10)
CHLORIDE SERPL-SCNC: 106 MMOL/L (ref 98–107)
CHOLEST SERPL-MCNC: 212 MG/DL
CREAT SERPL-MCNC: 0.97 MG/DL (ref 0.67–1.17)
DEPRECATED HCO3 PLAS-SCNC: 26 MMOL/L (ref 22–29)
GFR SERPL CREATININE-BSD FRML MDRD: >90 ML/MIN/1.73M2
GLUCOSE SERPL-MCNC: 100 MG/DL (ref 70–99)
HBA1C MFR BLD: 5.3 % (ref 0–5.6)
HDLC SERPL-MCNC: 50 MG/DL
LDLC SERPL CALC-MCNC: 143 MG/DL
NONHDLC SERPL-MCNC: 162 MG/DL
POTASSIUM SERPL-SCNC: 4.3 MMOL/L (ref 3.4–5.3)
PROT SERPL-MCNC: 6.9 G/DL (ref 6.4–8.3)
SODIUM SERPL-SCNC: 143 MMOL/L (ref 136–145)
TRIGL SERPL-MCNC: 97 MG/DL
TSH SERPL DL<=0.005 MIU/L-ACNC: 2.21 UIU/ML (ref 0.3–4.2)

## 2023-02-09 PROCEDURE — 99386 PREV VISIT NEW AGE 40-64: CPT | Mod: 25 | Performed by: FAMILY MEDICINE

## 2023-02-09 PROCEDURE — 36415 COLL VENOUS BLD VENIPUNCTURE: CPT | Performed by: FAMILY MEDICINE

## 2023-02-09 PROCEDURE — 80053 COMPREHEN METABOLIC PANEL: CPT | Performed by: FAMILY MEDICINE

## 2023-02-09 PROCEDURE — 87389 HIV-1 AG W/HIV-1&-2 AB AG IA: CPT | Performed by: FAMILY MEDICINE

## 2023-02-09 PROCEDURE — 80061 LIPID PANEL: CPT | Performed by: FAMILY MEDICINE

## 2023-02-09 PROCEDURE — 83036 HEMOGLOBIN GLYCOSYLATED A1C: CPT | Performed by: FAMILY MEDICINE

## 2023-02-09 PROCEDURE — 0124A COVID-19 VACCINE BIVALENT BOOSTER 12+ (PFIZER): CPT | Performed by: FAMILY MEDICINE

## 2023-02-09 PROCEDURE — 90472 IMMUNIZATION ADMIN EACH ADD: CPT | Performed by: FAMILY MEDICINE

## 2023-02-09 PROCEDURE — 90686 IIV4 VACC NO PRSV 0.5 ML IM: CPT | Performed by: FAMILY MEDICINE

## 2023-02-09 PROCEDURE — 90715 TDAP VACCINE 7 YRS/> IM: CPT | Performed by: FAMILY MEDICINE

## 2023-02-09 PROCEDURE — 84443 ASSAY THYROID STIM HORMONE: CPT | Performed by: FAMILY MEDICINE

## 2023-02-09 PROCEDURE — 91312 COVID-19 VACCINE BIVALENT BOOSTER 12+ (PFIZER): CPT | Performed by: FAMILY MEDICINE

## 2023-02-09 PROCEDURE — 86803 HEPATITIS C AB TEST: CPT | Performed by: FAMILY MEDICINE

## 2023-02-09 PROCEDURE — 90471 IMMUNIZATION ADMIN: CPT | Performed by: FAMILY MEDICINE

## 2023-02-09 ASSESSMENT — ENCOUNTER SYMPTOMS
ABDOMINAL PAIN: 0
MYALGIAS: 0
DYSURIA: 0
CHILLS: 0
WEAKNESS: 0
DIZZINESS: 0
SHORTNESS OF BREATH: 0
ARTHRALGIAS: 1
EYE PAIN: 0
PALPITATIONS: 0
SORE THROAT: 0
PARESTHESIAS: 0
HEADACHES: 0
HEMATOCHEZIA: 0
DIARRHEA: 0
FREQUENCY: 0
NERVOUS/ANXIOUS: 1
JOINT SWELLING: 1
HEMATURIA: 0
HEARTBURN: 0
CONSTIPATION: 0
FEVER: 0
COUGH: 0
NAUSEA: 0

## 2023-02-09 NOTE — PROGRESS NOTES
SUBJECTIVE:   CC: Popeye is an 42 year old who presents for preventative health visit.     Patient has been advised of split billing requirements and indicates understanding: Yes  Healthy Habits:     Getting at least 3 servings of Calcium per day:  NO    Bi-annual eye exam:  NO    Dental care twice a year:  Yes    Sleep apnea or symptoms of sleep apnea:  Sleep apnea    Diet:  Regular (no restrictions)    Frequency of exercise:  2-3 days/week    Duration of exercise:  45-60 minutes    Taking medications regularly:  Yes    Medication side effects:  None    PHQ-2 Total Score: 2    Additional concerns today:  No      PROBLEMS TO ADD ON...  New patient, is here to establish care, he is  has 2 cats, has not seen a doctor for many years, he uses CPAP for sleep apnea, he has Keeley syndrome quit smoking 5 years ago.  Past medical surgery included left ACL repair  Today's PHQ-2 Score:   PHQ-2 (  [employer]) 2023   Q1: Little interest or pleasure in doing things 1   Q2: Feeling down, depressed or hopeless 1   PHQ-2 Score 2   Q1: Little interest or pleasure in doing things Several days   Q2: Feeling down, depressed or hopeless Several days   PHQ-2 Score 2       Have you ever done Advance Care Planning? (For example, a Health Directive, POLST, or a discussion with a medical provider or your loved ones about your wishes): No, advance care planning information given to patient to review.  Patient declined advance care planning discussion at this time.    Social History     Tobacco Use     Smoking status: Former     Types: Cigarettes     Quit date: 2017     Years since quittin.1     Smokeless tobacco: Never   Substance Use Topics     Alcohol use: Yes     If you drink alcohol do you typically have >3 drinks per day or >7 drinks per week? Yes      Alcohol Use 2023   Prescreen: >3 drinks/day or >7 drinks/week? Yes   AUDIT SCORE  12     AUDIT - Alcohol Use Disorders Identification Test - Reproduced from the  World Health Organization Audit 2001 (Second Edition) 2023   1.  How often do you have a drink containing alcohol? 4 or more times a week   2.  How many drinks containing alcohol do you have on a typical day when you are drinking? 3 or 4   3.  How often do you have five or more drinks on one occasion? Monthly   4.  How often during the last year have you found that you were not able to stop drinking once you had started? Never   5.  How often during the last year have you failed to do what was normally expected of you because of drinking? Never   6.  How often during the last year have you needed a first drink in the morning to get yourself going after a heavy drinking session? Never   7.  How often during the last year have you had a feeling of guilt or remorse after drinking? Less than monthly   8.  How often during the last year have you been unable to remember what happened the night before because of your drinking? Never   9.  Have you or someone else been injured because of your drinking? No   10. Has a relative, friend, doctor or other health care worker been concerned about your drinking or suggested you cut down? Yes, during the last year   TOTAL SCORE 12       Last PSA: No results found for: PSA    Reviewed orders with patient. Reviewed health maintenance and updated orders accordingly - Yes  Lab work is in process  Labs reviewed in EPIC  BP Readings from Last 3 Encounters:   23 123/79   19 99/71   19 105/78    Wt Readings from Last 3 Encounters:   23 88 kg (194 lb)   21 80.7 kg (178 lb)   20 80.7 kg (178 lb)                  Patient Active Problem List   Diagnosis     S/P ACL repair     Anxiety     Marietta syndrome     No past surgical history on file.    Social History     Tobacco Use     Smoking status: Former     Types: Cigarettes     Quit date: 2017     Years since quittin.1     Smokeless tobacco: Never   Substance Use Topics     Alcohol use: Yes     No  family history on file.      No current outpatient medications on file.     No Known Allergies  Recent Labs   Lab Test 02/09/23  1010   A1C 5.3   *   HDL 50   TRIG 97   ALT 25   CR 0.97   GFRESTIMATED >90   POTASSIUM 4.3   TSH 2.21        Reviewed and updated as needed this visit by clinical staff    Allergies  Meds              Reviewed and updated as needed this visit by Provider                   No past medical history on file.   No past surgical history on file.  OB History   No obstetric history on file.       Review of Systems   Constitutional: Negative for chills and fever.   HENT: Negative for congestion, ear pain, hearing loss and sore throat.    Eyes: Negative for pain and visual disturbance.   Respiratory: Negative for cough and shortness of breath.    Cardiovascular: Negative for chest pain, palpitations and peripheral edema.   Gastrointestinal: Negative for abdominal pain, constipation, diarrhea, heartburn, hematochezia and nausea.   Genitourinary: Negative for dysuria, frequency, genital sores, hematuria, impotence, penile discharge and urgency.   Musculoskeletal: Positive for arthralgias and joint swelling. Negative for myalgias.   Skin: Negative for rash.   Neurological: Negative for dizziness, weakness, headaches and paresthesias.   Psychiatric/Behavioral: Negative for mood changes. The patient is nervous/anxious.          OBJECTIVE:   There were no vitals taken for this visit.    Physical Exam  GENERAL: healthy, alert and no distress  EYES: Eyes grossly normal to inspection, PERRL and conjunctivae and sclerae normal  HENT: ear canals and TM's normal, nose and mouth without ulcers or lesions  NECK: no adenopathy, no asymmetry, masses, or scars and thyroid normal to palpation  RESP: lungs clear to auscultation - no rales, rhonchi or wheezes and absence of ribs right upper chest area, mild pectus excavatum  CV: regular rate and rhythm, normal S1 S2, no S3 or S4, no murmur, click or rub, no  peripheral edema and peripheral pulses strong  ABDOMEN: soft, nontender, no hepatosplenomegaly, no masses and bowel sounds normal  MS: no gross musculoskeletal defects noted, no edema  SKIN: no suspicious lesions or rashes  NEURO: Normal strength and tone, mentation intact and speech normal  PSYCH: mentation appears normal, affect normal/bright    Diagnostic Test Results:  Labs reviewed in Epic    ASSESSMENT/PLAN:   (Z00.00) Visit for preventive health examination  (primary encounter diagnosis)  Comment:   Plan:     (Z11.4) Screening for HIV (human immunodeficiency virus)  Comment:   Plan: HIV Antigen Antibody Combo            (Z11.59) Need for hepatitis C screening test  Comment:   Plan: Hepatitis C Screen Reflex to HCV RNA Quant and         Genotype            (Z13.220) Screening for hyperlipidemia  Comment:   Plan: Lipid panel reflex to direct LDL Non-fasting            (Z13.29,  Z13.21,  Z13.228,  Z13.0) Screening for endocrine, nutritional, metabolic and immunity disorder  Comment:   Plan: **Comprehensive metabolic panel FUTURE 2mo,         **TSH with free T4 reflex FUTURE 2mo, Lipid         panel reflex to direct LDL Fasting, Hemoglobin         A1c FUTURE 3mo            (Q79.8) Keeley syndrome  Comment:   Plan:     (F41.9) Anxiety  Comment:   Plan:     New patient, here to establish care and get a physical, overall doing fine, mild anxiety has been stable, no recent exacerbation.  Discussed age-appropriate screening and immunizations, follow-up in 1 year.  Sooner if there is any new concern.    Patient has been advised of split billing requirements and indicates understanding: Yes      COUNSELING:   Reviewed preventive health counseling, as reflected in patient instructions       Regular exercise       Healthy diet/nutrition       Vision screening       Hearing screening       Consider Hep C screening for all patients one time for ages 18-79 years       HIV screeninx in teen years, 1x in adult years, and  at intervals if high risk       Osteoporosis prevention/bone health        He reports that he quit smoking about 5 years ago. He has never used smokeless tobacco.            Pearl Car MD  [facility]

## 2023-02-10 LAB
HCV AB SERPL QL IA: NONREACTIVE
HIV 1+2 AB+HIV1 P24 AG SERPL QL IA: NONREACTIVE

## 2024-01-18 ENCOUNTER — PATIENT OUTREACH (OUTPATIENT)
Dept: CARE COORDINATION | Facility: CLINIC | Age: 44
End: 2024-01-18

## 2024-02-01 ENCOUNTER — PATIENT OUTREACH (OUTPATIENT)
Dept: CARE COORDINATION | Facility: CLINIC | Age: 44
End: 2024-02-01
Payer: COMMERCIAL

## 2024-05-05 ENCOUNTER — HEALTH MAINTENANCE LETTER (OUTPATIENT)
Age: 44
End: 2024-05-05

## 2024-05-29 ENCOUNTER — VIRTUAL VISIT (OUTPATIENT)
Dept: SLEEP MEDICINE | Facility: CLINIC | Age: 44
End: 2024-05-29
Payer: COMMERCIAL

## 2024-05-29 ENCOUNTER — DOCUMENTATION ONLY (OUTPATIENT)
Dept: SLEEP MEDICINE | Facility: CLINIC | Age: 44
End: 2024-05-29

## 2024-05-29 VITALS — BODY MASS INDEX: 27.7 KG/M2 | HEIGHT: 69 IN | WEIGHT: 187 LBS

## 2024-05-29 DIAGNOSIS — G47.33 OSA ON CPAP: Primary | ICD-10-CM

## 2024-05-29 DIAGNOSIS — G47.21 CIRCADIAN RHYTHM SLEEP DISORDER, DELAYED SLEEP PHASE TYPE: ICD-10-CM

## 2024-05-29 DIAGNOSIS — G47.33 OBSTRUCTIVE SLEEP APNEA (ADULT) (PEDIATRIC): Primary | ICD-10-CM

## 2024-05-29 DIAGNOSIS — G47.26 CIRCADIAN RHYTHM SLEEP DISORDER, SHIFT WORK TYPE: ICD-10-CM

## 2024-05-29 PROCEDURE — 99203 OFFICE O/P NEW LOW 30 MIN: CPT | Mod: 95 | Performed by: INTERNAL MEDICINE

## 2024-05-29 ASSESSMENT — SLEEP AND FATIGUE QUESTIONNAIRES
HOW LIKELY ARE YOU TO NOD OFF OR FALL ASLEEP WHILE SITTING INACTIVE IN A PUBLIC PLACE: WOULD NEVER DOZE
HOW LIKELY ARE YOU TO NOD OFF OR FALL ASLEEP IN A CAR, WHILE STOPPED FOR A FEW MINUTES IN TRAFFIC: WOULD NEVER DOZE
HOW LIKELY ARE YOU TO NOD OFF OR FALL ASLEEP WHILE SITTING QUIETLY AFTER LUNCH WITHOUT ALCOHOL: MODERATE CHANCE OF DOZING

## 2024-05-29 ASSESSMENT — PAIN SCALES - GENERAL: PAINLEVEL: NO PAIN (0)

## 2024-05-29 NOTE — NURSING NOTE
Is the patient currently in the state of [place]? YES    Visit mode:VIDEO    If the visit is dropped, the patient can be reconnected by: VIDEO VISIT: Text to cell phone:   Telephone Information:   Mobile [phone]       Will anyone else be joining the visit? NO  (If patient encounters technical issues they should call [phone]    How would you like to obtain your AVS? MyChart    Are changes needed to the allergy or medication list? No    Are refills needed on medications prescribed by this physician? NO    Reason for visit: RECHECK    Mykel RICHARDSON

## 2024-05-29 NOTE — PROGRESS NOTES
Virtual Visit Details    Type of service:  Video Visit     Originating Location (pt. Location): Home    Distant Location (provider location):  Off-site  Platform used for Video Visit: [facility]  Sleep clinic follow-up visit note    Date: April 29, 2024    Chief complaint:  Follow-up of ACE, review CPAP compliance measures    Popeye Garcia is a 43 year old male who presents to sleep clinic for follow-up of previously diagnosed obstructive sleep apnea that is currently managed with CPAP therapy.    He was set up at [facility] on December 3, 2019. Patient received a Resmed AirSense 10 Auto. Pressures were set at 5-15 cm H2O.   Patient s ramp is 5 cm H2O for Auto and FLEX/EPR is 2. Patient received a Resmed Mask name: [id number] Nasal mask size Medium, heated tubing and heated humidifier.     Last sleep clinic viist 2/17/21 with Dr. French    Previous sleep study report:   8/12/19 [facility] Revealed [facility] Diagnostic Sleep Study (180.0 lbs) - AHI 14.3, RDI 18.4, Supine AHI 89.5, REM AHI 9.9, Low O2 80.8%, Time Spent ?88% 5.8 minutes / Time Spent ?89% 8.4 minutes. Increase supine sleep at home may increase degree of hypoxemia. Bruxism was heard    Pressure settings on CPAP ranged between 5 to15 cmH2O  Patient reports using the CPAP regularly during sleep. He uses full face mask.   There are reports of occasional snoring with CPAP, but he denies apneas or awakenings due to gasping with the device use.   Pressure settings feel comfortable.  Patient reports good sleep quality with the device use.   He works nights (work hours 4 pm-10/11 pm)  Bedtime time:1230-1 am ; Wake time: 10am  Patient denies nonrestorative sleep, fatigue or excessive daytime sleepiness. With CPAP use he endorses ESSat 6/24. He endorsed ESS at 16/24 when he did not use the CPAP(problem with the mask/hose)  There are no reports of drowsiness while driving.          DOWNLOADABLE COMPLIANCE DATA: (ResMed)  ResMed    Auto-PAP 5.0 - 15.0 cmH2O  30 day usage data:    43% of days with > 4 hours of use. 17/30 days with no use.   Average use 201 minutes per day.   95%ile Leak 2.46 L/min.   CPAP 95% pressure 14.2 cm.   AHI 4.19 events per hour.      Past medical/surgical history, family history, social history, medications and allergies were reviewed.      Problem list:  Patient Active Problem List   Diagnosis    S/P ACL repair    Anxiety    Keeley syndrome        Physical Examination:   General: Pleasant. Cooperative. In no apparent distress.  Pulmonary: Able to speak in full sentences easily. No cough or wheeze.   Neurologic: Alert, oriented x3.  Psychiatric: Mood euthymic. Affect congruent with full range and intensity.     ASSESSMENT/PLAN:  Obstructive sleep apnea: Pt reports reduced compliance with CPAP and reports positive benefits with CPAP treatment. Based on compliance measures, ACE is adequately controlled with CPAP at the current settings.    We discussed the consequences of untreated sleep apnea.  DME orders were generated for revision of the pressure settings on the auto CPAP to range 6 to 16 cm water based on the compliance download and for renewal of CPAP supplies.  Recommended to continue using the CPAP regularly during sleep, improve the CPAP compliance and getting the supplies for the CPAP replaced regularly.   Patient instructed to remember to bring PAP with him if hospitalized and if anticipating procedure that requires sedation/surgery to be sure to discuss with the provider/surgeon that he has sleep apnea and uses PAP therapy.    We discussed that he would be be eligible for replacement CPAP after December 3, 2024.  He was instructed to communicate via [employer] if he is interested in obtaining a replacement device around November/Dec 2024. Plan is to follow-up video visit in approximately 2 months after obtaining the replacement CPAP device if he needs to meet compliance. Otherwise he will follow-up via video visit in 1 year.     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     Patient was strongly advised to avoid driving, operating any heavy machinery or other hazardous situations while drowsy or sleepy.  Patient was counseled on the importance of driving while alert, to pull over if drowsy, or nap before getting into the vehicle if sleepy.         The above note was dictated using voice recognition software. Although reviewed after completion, some word and grammatical error may remain . Please contact the author for any clarifications.      \" Total time spent was 35 minutes  for this appointment on this date of service which include time spent before, during and after the visit for chart review, patient care, counseling and coordination of care. Including documentation\"      Zainab Hernandez MD  [facility]  [address] , [place] [address]      "

## 2024-05-30 NOTE — PATIENT INSTRUCTIONS
Obstructive sleep apnea: Based on compliance measures, your sleep apnea is adequately controlled with CPAP at the current settings.    We will  revise the pressure settings on your auto CPAP remotely to range 6 to 16 cm water based on the compliance download and prescription was provided today for renewal of CPAP supplies.  Recommend continue using the CPAP regularly during sleep, improving the CPAP compliance, using it for the entire duration of sleep to derive maximum benefit. Untreated sleep apnea can contribute to heart disease, stroke and diabetes.   Please replace the CPAP supplies regularly.   Please remember to bring CPAP with you  if hospitalized and if anticipating procedure that requires sedation/surgery to be sure to discuss with the provider/surgeon that you have sleep apnea and use CPAP therapy.    You could be be eligible for replacement CPAP after December 3, 2024.  Please communicate via [employer] if you are interested in obtaining a replacement device. Plan is to follow-up video visit in approximately 2 months after obtaining the replacement CPAP device if you need to meet compliance for insurance. Otherwise we will plan for follow-up via video visit in 1 year.     Delayed sleep with superimposed shiftwork recommend prioritizing sleep and following a regular sleep schedule aiming at obtaining 7-1/2 to 8 hours of sleep per night and avoiding sleep deprivation    Please follow healthy diet, and exercise regularly.     Please avoid driving, operating any heavy machinery or other hazardous situations while drowsy or sleepy.

## 2024-05-31 NOTE — NURSING NOTE
1 year appointment reminder message was created and will be sent out 2 - 3 months prior to next appointment due date. Via [employer]    Changed pressures via [employer].

## 2024-12-17 ENCOUNTER — TELEPHONE (OUTPATIENT)
Dept: SLEEP MEDICINE | Facility: CLINIC | Age: 44
End: 2024-12-17
Payer: COMMERCIAL

## 2024-12-17 DIAGNOSIS — G47.33 OSA ON CPAP: Primary | ICD-10-CM

## 2024-12-17 NOTE — TELEPHONE ENCOUNTER
General Call      Reason for Call:  patient called and would like a new prescripbtion for a cpap mesha from Cristino Watson at  [facility]     [facility]    Please contact patient.  Thank you.    What are your questions or concerns:  na    Date of last appointment with provider: May 2024    Could we send this information to you in OVIA or would you prefer to receive a phone call?:   Patient would prefer a phone call   Okay to leave a detailed message?: Yes at Cell number on file:    Telephone Information:   Mobile [phone]

## 2025-01-07 ENCOUNTER — DOCUMENTATION ONLY (OUTPATIENT)
Dept: SLEEP MEDICINE | Facility: CLINIC | Age: 45
End: 2025-01-07
Payer: COMMERCIAL

## 2025-01-07 NOTE — PROGRESS NOTES
Patient was offered choice of vendor and chose [facility].  Patient Popeye Garcia was set up at [facility] on January 7, 2025. Patient received a Resmed Airsense 10 Pressures were set at  5-15 cm H2O.   Patient s ramp is OFF cm H2O for Off and FLEX/EPR is 2.  heated humidifier.  Patient has the following compliance requirements: using and visit requirements  Patient has a follow up on 03/19/25 with Noemi Bergeron CNP.    Kalina Campbell

## 2025-03-19 ENCOUNTER — OFFICE VISIT (OUTPATIENT)
Dept: SLEEP MEDICINE | Facility: CLINIC | Age: 45
End: 2025-03-19
Payer: COMMERCIAL

## 2025-03-19 VITALS
OXYGEN SATURATION: 96 % | DIASTOLIC BLOOD PRESSURE: 73 MMHG | SYSTOLIC BLOOD PRESSURE: 108 MMHG | HEART RATE: 60 BPM | BODY MASS INDEX: 26.96 KG/M2 | HEIGHT: 69 IN | WEIGHT: 182 LBS

## 2025-03-19 DIAGNOSIS — F41.9 ANXIETY: ICD-10-CM

## 2025-03-19 DIAGNOSIS — G47.26 CIRCADIAN RHYTHM SLEEP DISORDER, SHIFT WORK TYPE: ICD-10-CM

## 2025-03-19 DIAGNOSIS — G47.33 OSA (OBSTRUCTIVE SLEEP APNEA): Primary | ICD-10-CM

## 2025-03-19 PROCEDURE — 3074F SYST BP LT 130 MM HG: CPT | Performed by: NURSE PRACTITIONER

## 2025-03-19 PROCEDURE — 3078F DIAST BP <80 MM HG: CPT | Performed by: NURSE PRACTITIONER

## 2025-03-19 PROCEDURE — 1126F AMNT PAIN NOTED NONE PRSNT: CPT | Performed by: NURSE PRACTITIONER

## 2025-03-19 PROCEDURE — 99203 OFFICE O/P NEW LOW 30 MIN: CPT | Performed by: NURSE PRACTITIONER

## 2025-03-19 ASSESSMENT — SLEEP AND FATIGUE QUESTIONNAIRES
HOW LIKELY ARE YOU TO NOD OFF OR FALL ASLEEP WHILE WATCHING TV: SLIGHT CHANCE OF DOZING
HOW LIKELY ARE YOU TO NOD OFF OR FALL ASLEEP WHILE SITTING AND READING: SLIGHT CHANCE OF DOZING
HOW LIKELY ARE YOU TO NOD OFF OR FALL ASLEEP WHEN YOU ARE A PASSENGER IN A CAR FOR AN HOUR WITHOUT A BREAK: WOULD NEVER DOZE
HOW LIKELY ARE YOU TO NOD OFF OR FALL ASLEEP WHILE LYING DOWN TO REST IN THE AFTERNOON WHEN CIRCUMSTANCES PERMIT: SLIGHT CHANCE OF DOZING
HOW LIKELY ARE YOU TO NOD OFF OR FALL ASLEEP WHILE SITTING AND TALKING TO SOMEONE: WOULD NEVER DOZE
HOW LIKELY ARE YOU TO NOD OFF OR FALL ASLEEP WHILE SITTING INACTIVE IN A PUBLIC PLACE: WOULD NEVER DOZE
HOW LIKELY ARE YOU TO NOD OFF OR FALL ASLEEP IN A CAR, WHILE STOPPED FOR A FEW MINUTES IN TRAFFIC: WOULD NEVER DOZE
HOW LIKELY ARE YOU TO NOD OFF OR FALL ASLEEP WHILE SITTING QUIETLY AFTER LUNCH WITHOUT ALCOHOL: WOULD NEVER DOZE

## 2025-03-19 NOTE — PATIENT INSTRUCTIONS
Your Body mass index is 26.86 kg/m .  Weight management is a personal decision.  If you are interested in exploring weight loss strategies, the following discussion covers the approaches that may be successful. Body mass index (BMI) is one way to tell whether you are at a healthy weight, overweight, or obese. It measures your weight in relation to your height.  A BMI of 18.5 to 24.9 is in the healthy range. A person with a BMI of 25 to 29.9 is considered overweight, and someone with a BMI of 30 or greater is considered obese. More than two-thirds of American adults are considered overweight or obese.  Being overweight or obese increases the risk for further weight gain. Excess weight may lead to heart disease and diabetes.  Creating and following plans for healthy eating and physical activity may help you improve your health.  Weight control is part of healthy lifestyle and includes exercise, emotional health, and healthy eating habits. Careful eating habits lifelong are the mainstay of weight control. Though there are significant health benefits from weight loss, long-term weight loss with diet alone may be very difficult to achieve- studies show long-term success with dietary management in less than 10% of people. Attaining a healthy weight may be especially difficult to achieve in those with severe obesity. In some cases, medications, devices and surgical management might be considered.  What can you do?  If you are overweight or obese and are interested in methods for weight loss, you should discuss this with your provider.   Consider reducing daily calorie intake by 500 calories.   Keep a food journal.   Avoiding skipping meals, consider cutting portions instead.    Diet combined with exercise helps maintain muscle while optimizing fat loss. Strength training is particularly important for building and maintaining muscle mass. Exercise helps reduce stress, increase energy, and improves fitness. Increasing  exercise without diet control, however, may not burn enough calories to loose weight.     Start walking three days a week 10-20 minutes at a time  Work towards walking thirty minutes five days a week   Eventually, increase the speed of your walking for 1-2 minutes at time    In addition, we recommend that you review healthy lifestyles and methods for weight loss available through the National Institutes of Health patient information sites:  http://win.niddk.nih.gov/publications/index.htm    And look into health and wellness programs that may be available through your health insurance provider, employer, local community center, or [employer].

## 2025-03-19 NOTE — NURSING NOTE
"Chief Complaint   Patient presents with    CPAP Follow Up       Initial /73   Pulse 60   Ht 1.753 m (5' 9.02\")   Wt 82.6 kg (182 lb)   SpO2 96%   BMI 26.86 kg/m   Estimated body mass index is 26.86 kg/m  as calculated from the following:    Height as of this encounter: 1.753 m (5' 9.02\").    Weight as of this encounter: 82.6 kg (182 lb).    Medication Reconciliation: complete    Neck circumference: 43 centimeters.    DME: [facility] on 3/19/2025       "

## 2025-03-19 NOTE — PROGRESS NOTES
Obstructive Sleep Apnea - PAP Follow-Up Visit:    Chief Complaint   Patient presents with    CPAP Follow Up       Popeye Garcia comes in today for follow-up of their mild sleep apnea, managed with CPAP.     Popeye Garcia has a medical history notable for anxiety, Keeley syndrome and sleep apnea.  He is overweight.    Do you use a CPAP Machine at home: (Patient-Rptd) Yes  Overall, on a scale of 0-10 how would you rate your CPAP (0 poor, 10 great): (Patient-Rptd) 9  Is your mask comfortable: (Patient-Rptd) Yes  If not, why:    Is you mask leaking: (Patient-Rptd) No  If yes, where do you feel it:    How many night per week does the mask leak (0-7):    Do you notice snoring with mask on: (Patient-Rptd) Yes  Do you notice gasping arousals with mask on: (Patient-Rptd) No  Are you having significant oral or nasal dryness: (Patient-Rptd) No  Is the pressure setting comfortable: (Patient-Rptd) Yes  In not, why:    What type of mask do you use: (Patient-Rptd) Full Face Mask  What is your typical bedtime: (Patient-Rptd) 2am  How long does it take you to go to sleep on PAP therapy: (Patient-Rptd)  20 min  What time do you typically get out of bed for the day: (Patient-Rptd) 10am  How many hours on average per night are you using PAP therapy: (Patient-Rptd) 8  How many hours are you sleeping per night: (Patient-Rptd) 8  Do you feel well rested in the morning: (Patient-Rptd) Yes    EPWORTH SLEEPINESS SCALE         3/19/2025     9:51 AM    [place] Sleepiness Scale ( M.WCynthia Teixeira  [id number]<br>ESS - [place]/English - Final version - 21 Nov 07 - [facility] [place].)   Sitting and reading Slight chance of dozing   Watching TV Slight chance of dozing   Sitting, inactive in a public place (e.g. a theatre or a meeting) Would never doze   As a passenger in a car for an hour without a break Would never doze   Lying down to rest in the afternoon when circumstances permit Slight chance of dozing   Sitting and talking to someone Would never  doze   Sitting quietly after a lunch without alcohol Would never doze   In a car, while stopped for a few minutes in traffic Would never doze   [place] Score (MC) 3   [place] Score (Sleep) 3        Patient-reported       INSOMNIA SEVERITY INDEX (ADRI)          3/19/2025     9:48 AM   Insomnia Severity Index (ADRI)   Difficulty falling asleep 0   Difficulty staying asleep 0   Problems waking up too early 0   How SATISFIED/DISSATISFIED are you with your CURRENT sleep pattern? 0   How NOTICEABLE to others do you think your sleep problem is in terms of impairing the quality of your life? 1   How WORRIED/DISTRESSED are you about your current sleep problem? 1   To what extent do you consider your sleep problem to INTERFERE with your daily functioning (e.g. daytime fatigue, mood, ability to function at work/daily chores, concentration, memory, mood, etc.) CURRENTLY? 0   ADRI Total Score 2        Patient-reported       Guidelines for Scoring/Interpretation:  Total score categories:  0-7 = No clinically significant insomnia   8-14 = Subthreshold insomnia   15-21 = Clinical insomnia (moderate severity)  22-28 = Clinical insomnia (severe)  Used via courtesy of www.[place].gov with permission from Clifton Valerio PhD., [facility]    Objective:  CPAP Compliance Targets:   >70% days > 4 hours AHI < 5   30 days ending January 6, 2025   [employer]   Auto-PAP 6.0 - 15.0 cmH2O 30 day usage data:    90% of days with > 4 hours of use. 1/30 days with no use.   Average use 7 hours, 44 minutes per day.   95%ile Leak 0.0/min.   CPAP 95% pressure 15.5 cm.   AHI 3.5 events per hour       Previous sleep study report:   8/12/19 [facility]aled [facility] Diagnostic Sleep Study (180.0 lbs) - AHI 14.3, RDI 18.4, Supine AHI 89.5, REM AHI 9.9, Low O2 80.8%, Time Spent <=88% 5.8 minutes / Time Spent <=89% 8.4 minutes. Increase supine sleep at home may increase degree of hypoxemia. 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 ACE (obstructive sleep apnea)  G47.33 COMPREHENSIVE DME      2. Circadian rhythm sleep disorder, shift work type  G47.26 COMPREHENSIVE DME      3. Anxiety  F41.9 COMPREHENSIVE DME        Popeye Garcia is a pleasant 44-year-old gentleman who presents to the sleep medicine clinic today for a visit of efficacy and compliance in the treatment of his mild sleep apnea with PAP therapy.  Patient was given accolades for his profound improvement with his CPAP therapy usage.  He appreciates the benefits that PAP therapy has been bringing to him.  He denies any symptoms commonly associated with obstructive sleep apnea such as intrusion of daytime sleepiness as well as intrusion of sleepiness into his driving capability.  A comprehensive order for needed supplies and equipment was placed today, and patient was instructed to follow-up with the sleep medicine clinic in 1 year, sooner if needed.  In addition, recommend patient optimize his sleep schedule as well as his sleep hygiene practices to mitigate any further sleep deprivation.  Recommend patient employ safe driving practices such as not driving a motor vehicle should he become drowsy.  Recommend patient maintain his BMI below 30.0.    30 minutes spent with patient, all of which were spent face-to-face counseling, consulting, coordinating plan of care.  The longitudinal plan of care for the " diagnosis(es)/condition(s) as documented were addressed during this visit. Due to the added complexity in care, I will continue to support Popeye in the subsequent management and with ongoing continuity of care.    ROME Arguello CNP  Sleep Medicine    CC:  Clinic - [facility],

## 2025-05-17 ENCOUNTER — HEALTH MAINTENANCE LETTER (OUTPATIENT)
Age: 45
End: 2025-05-17

## 2025-06-03 ENCOUNTER — ANCILLARY PROCEDURE (OUTPATIENT)
Dept: GENERAL RADIOLOGY | Facility: CLINIC | Age: 45
End: 2025-06-03
Attending: PHYSICIAN ASSISTANT
Payer: COMMERCIAL

## 2025-06-03 ENCOUNTER — OFFICE VISIT (OUTPATIENT)
Dept: URGENT CARE | Facility: URGENT CARE | Age: 45
End: 2025-06-03
Payer: COMMERCIAL

## 2025-06-03 ENCOUNTER — TELEPHONE (OUTPATIENT)
Dept: ORTHOPEDICS | Facility: CLINIC | Age: 45
End: 2025-06-03

## 2025-06-03 VITALS
HEIGHT: 66 IN | DIASTOLIC BLOOD PRESSURE: 72 MMHG | OXYGEN SATURATION: 99 % | HEART RATE: 67 BPM | RESPIRATION RATE: 17 BRPM | TEMPERATURE: 97.9 F | SYSTOLIC BLOOD PRESSURE: 104 MMHG | WEIGHT: 197 LBS | BODY MASS INDEX: 31.66 KG/M2

## 2025-06-03 DIAGNOSIS — S69.91XA WRIST INJURY, RIGHT, INITIAL ENCOUNTER: ICD-10-CM

## 2025-06-03 DIAGNOSIS — S69.91XA WRIST INJURY, RIGHT, INITIAL ENCOUNTER: Primary | ICD-10-CM

## 2025-06-03 DIAGNOSIS — S52.571A OTHER CLOSED INTRA-ARTICULAR FRACTURE OF DISTAL END OF RIGHT RADIUS, INITIAL ENCOUNTER: ICD-10-CM

## 2025-06-03 PROCEDURE — 1125F AMNT PAIN NOTED PAIN PRSNT: CPT | Performed by: PHYSICIAN ASSISTANT

## 2025-06-03 PROCEDURE — 3078F DIAST BP <80 MM HG: CPT | Performed by: PHYSICIAN ASSISTANT

## 2025-06-03 PROCEDURE — 73110 X-RAY EXAM OF WRIST: CPT | Mod: TC | Performed by: RADIOLOGY

## 2025-06-03 PROCEDURE — 99214 OFFICE O/P EST MOD 30 MIN: CPT | Performed by: PHYSICIAN ASSISTANT

## 2025-06-03 PROCEDURE — 3074F SYST BP LT 130 MM HG: CPT | Performed by: PHYSICIAN ASSISTANT

## 2025-06-03 ASSESSMENT — PAIN SCALES - GENERAL: PAINLEVEL_OUTOF10: SEVERE PAIN (8)

## 2025-06-03 NOTE — PROGRESS NOTES
Urgent Care Clinic Visit    Chief Complaint   Patient presents with    Urgent Care     Last night he fell and injured his right wrist                6/3/2025    10:57 AM   Additional Questions   Roomed by Lluvia Pena   Accompanied by self
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
## 2025-06-05 ENCOUNTER — PRE VISIT (OUTPATIENT)
Dept: ORTHOPEDICS | Facility: CLINIC | Age: 45
End: 2025-06-05

## 2025-06-05 ENCOUNTER — OFFICE VISIT (OUTPATIENT)
Dept: ORTHOPEDICS | Facility: CLINIC | Age: 45
End: 2025-06-05
Payer: COMMERCIAL

## 2025-06-05 DIAGNOSIS — S52.571A OTHER CLOSED INTRA-ARTICULAR FRACTURE OF DISTAL END OF RIGHT RADIUS, INITIAL ENCOUNTER: ICD-10-CM

## 2025-06-05 SDOH — HEALTH STABILITY: PHYSICAL HEALTH: ON AVERAGE, HOW MANY DAYS PER WEEK DO YOU ENGAGE IN MODERATE TO STRENUOUS EXERCISE (LIKE A BRISK WALK)?: 3 DAYS
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
## 2025-06-12 ENCOUNTER — OFFICE VISIT (OUTPATIENT)
Dept: ORTHOPEDICS | Facility: CLINIC | Age: 45
End: 2025-06-12
Payer: COMMERCIAL

## 2025-06-12 DIAGNOSIS — S52.571A OTHER CLOSED INTRA-ARTICULAR FRACTURE OF DISTAL END OF RIGHT RADIUS, INITIAL ENCOUNTER: Primary | ICD-10-CM

## 2025-06-12 NOTE — PROGRESS NOTES
Cast/splint application    Date/Time: 6/12/2025 10:50 AM    Performed by: Nikhil Villalta, ATC  Authorized by: Yair Conner DO    Consent:     Consent obtained:  Verbal    Consent given by:  Patient    Risks discussed:  Discoloration, numbness, pain and swelling  Pre-procedure details:     Sensation:  Normal  Procedure details:     Laterality:  Right    Location:  Wrist    Wrist:  R wrist    Cast type:  Short arm    Supplies:  Fiberglass  Post-procedure details:     Pain:  Unchanged    Pain level:  0/10    Sensation:  Normal    Patient tolerance of procedure:  Tolerated well, no immediate complications    Patient provided with cast or splint care instructions: Yes    Comments:        Nikhil Villalta M.A., LAT, ATC  Certified Athletic Trainer
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
## 2025-07-14 DIAGNOSIS — S52.571A OTHER CLOSED INTRA-ARTICULAR FRACTURE OF DISTAL END OF RIGHT RADIUS, INITIAL ENCOUNTER: ICD-10-CM

## 2025-07-16 NOTE — PROGRESS NOTES
[facility]  Sports Medicine Clinic  Clinics and Surgery Center           SUBJECTIVE   Previous progress notes related to today's visit diagnosis have been personally reviewed and discussed with the patient.       Popeye Garcia is a 44 year old male presenting to clinic today for a follow-up visit.  Popeye is overall doing very well.  Has been compliant in the cast.  No pain.  No numbness and tingling.  Today, with the except his hand table, he did notice some tightness and pain on the dorsum of the wrist.  No swelling.      PMH, Medications and Allergies were reviewed and updated as needed.    ROS:  As noted above otherwise negative.    Patient Active Problem List   Diagnosis    S/P ACL repair    Anxiety    [place] syndrome    ACE (obstructive sleep apnea)       No current outpatient medications on file.            OBJECTIVE:       Vitals: There were no vitals filed for this visit.  BMI: There is no height or weight on file to calculate BMI.    Gen:  Well nourished and in no acute distress  HEENT: Extraocular movement intact  Neck: Supple  Pulm:  Breathing Comfortably. No increased respiratory effort.  Psych: Euthymic. Appropriately answers questions    MSK: Right wrist without evidence of erythema or ecchymosis.  No edema.  No overlying warmth.  No discernible bony tenderness to palpation or soft tissue tenderness to palpation of the dorsal, ventral, or radial/ulnar aspect of the wrist.  Limited range of motion in flexion and extension, as well as ulnar deviation secondary to mild pain and guarding.   strength is appropriate.  While finger abduction strength is appropriate.  Patient has a negative CMC grind and TFCC grind.  He does have a weakly positive Finkelstein, likely resulting from the immobilization.    Study Result    Narrative & Impression   3 views right wrist radiographs 6/12/2025 10:27 AM     History: Other closed intra-articular fracture of distal end of right  radius, initial  encounter     Comparison: 6/3/2025     Findings:     PA, oblique and lateral view(s) of the right wrist were obtained.      Ongoing healing nondisplaced intra-articular distal radial fracture  with persistent but decreased visualization of the fracture line.     Persistent widening of distal radioulnar joint, may be related to  underlying joint effusion present.     No substantial degenerative change. Previously visualized tiny  mineralization adjacent to ulnar styloid has resolved.                                                                      Impression: Ongoing healing nondisplaced intra-articular distal radial  fracture with persistent but decreased visualization of the fracture  line.     XRAY : I personally reviewed the x-rays today, there appears to be ongoing healing of the nondisplaced intra-articular fracture, with  decreased visualization of the fracture line when compared to previous x-rays.          ASSESSMENT and PLAN:     Popeye was seen today for follow up.    Diagnoses and all orders for this visit:    Other closed intra-articular fracture of distal end of right radius, initial encounter  -     Hand Therapy Referral; Future      Popeye is a 44-year-old male, , presenting to clinic 6 weeks after initial fracture of the distal radius.  X-rays in clinic today which showed ongoing healing.  Clinical exam is also reassuring.    Plan:  We have discussed that this fracture pattern and how well Popeye is doing in the cast immobilization.  He does have some range of motion limitations, that are likely being caused some strain tendons of the extensor and flexor compartment.  I do think this will be remedied with hand therapy.  I also think the patient should continue to wear the wrist brace that he already has, for the next couple of weeks at night, and during work, with frequent removal for active range of motion as tolerated.  Patient can follow-up in our clinic at this point as needed.  Work  restrictions have been lifted, other than the patient wearing the brace while working for the next couple of weeks while undergoing hand therapy.  All questions answered.  Postfracture complications have been discussed again in detail.  Anticipatory guidance counseled.  Return precautions advised.    Options for treatment and/or follow-up care were reviewed with the patient was actively involved in the decision making process. Patient verbalized understanding and was in agreement with the plan.      Disclaimer:  This note consists of symbols derived from keyboarding, dictation, and/or voice recognition software. As a result, although I do diligently check for accuracy, there may be errors in the script that have gone undetected. Please consider this when interpreting information found in this chart    Yair Conner DO  , Sports Medicine  Department of Family Medicine and [facility]

## 2025-07-17 ENCOUNTER — OFFICE VISIT (OUTPATIENT)
Dept: ORTHOPEDICS | Facility: CLINIC | Age: 45
End: 2025-07-17
Payer: COMMERCIAL

## 2025-07-17 DIAGNOSIS — S52.571A OTHER CLOSED INTRA-ARTICULAR FRACTURE OF DISTAL END OF RIGHT RADIUS, INITIAL ENCOUNTER: Primary | ICD-10-CM

## 2025-07-17 SDOH — HEALTH STABILITY: PHYSICAL HEALTH: ON AVERAGE, HOW MANY DAYS PER WEEK DO YOU ENGAGE IN MODERATE TO STRENUOUS EXERCISE (LIKE A BRISK WALK)?: 4 DAYS

## 2025-07-17 NOTE — LETTER
Return to Work  2025     Seen today: Yes    Patient:  Popeye Garcia  :   1980  MRN:     [id number]  Physician: HUBER CONNER    Popeye Garcia may return to work on Date: 25.  Please allow him to wear his brace at all times during work for the next couple of weeks..      The next clinic appointment is scheduled for (date/time) None.    Patient limitations:  None, please wear brace during working hours until completion of hand therapy.          Electronically signed by Huber Conner DO

## 2025-07-17 NOTE — Clinical Note
2025    Popeye Garcia   1980        To Whom it May Concern;    Please excuse Popeye Florencetrudyrebecca from work/school for a healthcare visit on 2025.    Sincerely,        Yair Conner, DO

## 2025-07-17 NOTE — LETTER
7/17/2025      RE: Popeye Garcia  [address] [place] [address]     Dear Colleague,    Thank you for referring your patient, Popeye Garcia, to the [facility] SPORTS MEDICINE CLINIC [place]. Please see a copy of my visit note below.    [facility]  Sports Medicine Clinic  Clinics and Surgery Center           SUBJECTIVE   Previous progress notes related to today's visit diagnosis have been personally reviewed and discussed with the patient.       Popeye Garcia is a 44 year old male presenting to clinic today for a follow-up visit.  Popeye is overall doing very well.  Has been compliant in the cast.  No pain.  No numbness and tingling.  Today, with the except his hand table, he did notice some tightness and pain on the dorsum of the wrist.  No swelling.      PMH, Medications and Allergies were reviewed and updated as needed.    ROS:  As noted above otherwise negative.    Patient Active Problem List   Diagnosis     S/P ACL repair     Anxiety     [place] syndrome     ACE (obstructive sleep apnea)       No current outpatient medications on file.            OBJECTIVE:       Vitals: There were no vitals filed for this visit.  BMI: There is no height or weight on file to calculate BMI.    Gen:  Well nourished and in no acute distress  HEENT: Extraocular movement intact  Neck: Supple  Pulm:  Breathing Comfortably. No increased respiratory effort.  Psych: Euthymic. Appropriately answers questions    MSK: Right wrist without evidence of erythema or ecchymosis.  No edema.  No overlying warmth.  No discernible bony tenderness to palpation or soft tissue tenderness to palpation of the dorsal, ventral, or radial/ulnar aspect of the wrist.  Limited range of motion in flexion and extension, as well as ulnar deviation secondary to mild pain and guarding.   strength is appropriate.  While finger abduction strength is appropriate.  Patient has a negative CMC grind and TFCC grind.  He does have a  weakly positive Finkelstein, likely resulting from the immobilization.    Study Result    Narrative & Impression   3 views right wrist radiographs 6/12/2025 10:27 AM     History: Other closed intra-articular fracture of distal end of right  radius, initial encounter     Comparison: 6/3/2025     Findings:     PA, oblique and lateral view(s) of the right wrist were obtained.      Ongoing healing nondisplaced intra-articular distal radial fracture  with persistent but decreased visualization of the fracture line.     Persistent widening of distal radioulnar joint, may be related to  underlying joint effusion present.     No substantial degenerative change. Previously visualized tiny  mineralization adjacent to ulnar styloid has resolved.                                                                      Impression: Ongoing healing nondisplaced intra-articular distal radial  fracture with persistent but decreased visualization of the fracture  line.     XRAY : I personally reviewed the x-rays today, there appears to be ongoing healing of the nondisplaced intra-articular fracture, with  decreased visualization of the fracture line when compared to previous x-rays.          ASSESSMENT and PLAN:     Popeye was seen today for follow up.    Diagnoses and all orders for this visit:    Other closed intra-articular fracture of distal end of right radius, initial encounter  -     Hand Therapy Referral; Future      Popeye is a 44-year-old male, , presenting to clinic 6 weeks after initial fracture of the distal radius.  X-rays in clinic today which showed ongoing healing.  Clinical exam is also reassuring.    Plan:  We have discussed that this fracture pattern and how well Popeye is doing in the cast immobilization.  He does have some range of motion limitations, that are likely being caused some strain tendons of the extensor and flexor compartment.  I do think this will be remedied with hand therapy.  I also think the patient  should continue to wear the wrist brace that he already has, for the next couple of weeks at night, and during work, with frequent removal for active range of motion as tolerated.  Patient can follow-up in our clinic at this point as needed.  Work restrictions have been lifted, other than the patient wearing the brace while working for the next couple of weeks while undergoing hand therapy.  All questions answered.  Postfracture complications have been discussed again in detail.  Anticipatory guidance counseled.  Return precautions advised.    Options for treatment and/or follow-up care were reviewed with the patient was actively involved in the decision making process. Patient verbalized understanding and was in agreement with the plan.      Disclaimer:  This note consists of symbols derived from keyboarding, dictation, and/or voice recognition software. As a result, although I do diligently check for accuracy, there may be errors in the script that have gone undetected. Please consider this when interpreting information found in this chart    Yair Conner DO  , Sports Medicine  Department of Family [facility] and [facility]      Again, thank you for allowing me to participate in the care of your patient.      Sincerely,    Yair Conner DO

## 2025-07-22 NOTE — PROGRESS NOTES
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 Reports he was told this would serve more as a reminder to not over use the wrist.        Presenting condition or subjective complaint: wrist  Date of onset: 07/17/25    Relevant medical history:     Dates & types of surgery:      Prior diagnostic imaging/testing results: X-ray     Prior therapy history for the same diagnosis, illness or injury: No      Prior Level of Function  Ind with ADL, IADL, work, and driving    Living Environment  Social support: With a significant other or spouse   Type of home: House   Stairs to enter the home: Yes 5 Is there a railing: Yes     Ramp: No   Stairs inside the home: Yes 20 Is there a railing: Yes     Help at home: None  Equipment owned:       Employment: Yes   Hobbies/Interests: gym outdoors concerts    Patient goals for therapy: more range of movement     Objective   ADDITIONAL HISTORY:  Left hand dominant  Patient reports symptoms of pain, stiffness/loss of motion, weakness/loss of strength, and edema  Transportation: drives  Currently working in normal job without restrictions    Functional Outcome Measure:       7/23/2025    12:52 PM   Upper Extremity Functional Index (  [address] [place] [address] [place])   a.  Any of your usual work, housework or school activities 3-A Little bit of Difficulty   b.  Your usual hobbies, recreational or sporting activities 3-A Little bit of Difficulty   c.  Lifting a bag of groceries to waist level 4-No Difficulty   d.  Placing an object onto, or removing it from an overhead shelf 3-A Little bit of Difficulty   e.  Washing your hair or scalp 3-A Little bit of Difficulty   f.    08-Jun-2020 03:50 Pushing up on your hands (e.g., from bathtub or chair) 3-A Little bit of Difficulty   g.  Preparing food (e.g., peeling, cutting) 3-A Little bit of Difficulty   h.  Driving 4-No Difficulty   I.   Vacuuming, sweeping, or raking 4-No Difficulty   j.   Dressing 3-A Little bit of Difficulty   k.  Doing up buttons 3-A Little bit of Difficulty   l.   Using tools or appliances 3-A Little bit of Difficulty   m. Opening doors 4-No Difficulty   n.  Cleaning 3-A Little bit of Difficulty   o.  Tying or lacing shoes 3-A Little bit of Difficulty   p.  Sleeping 4-No Difficulty   q.  Laundering clothes. (e.g., washing, ironing, folding) 4-No Difficulty   r.   Opening a jar 3-A Little bit of Difficulty   s.  Throwing a ball 4-No Difficulty   t.   Carrying a small suitcase with your affected limb  4-No Difficulty   Column Totals: /80 68        Patient-reported           Objective:    Pain Level (Scale 0-10)      At Rest 0/10   With Use 8/10     Pain Description      Location wrist   Pain Quality Sharp and Stabbing   Frequency intermittent or daily     Pain is worst  daytime   Exacerbated by  Wrist extension with rotation,    Relieved by rest and Splinting   Progression Improving     Sensation    WNL throughout all nerve distributions; per patient report      Edema (Circumference measured in cm)        R L   DWC 17.8 18.1       ROM        Able to make full composite fist and achieve full digit extension  Kapandji Opposition 9/10 but painful      Palpation  Pain Report:  -none + mild    ++ moderate    +++ severe     Non tender to radial styloid, DRUJ, SL interval, 1st dorsal compartment, CMC; ulnar fovea very mildly tender    Strength   (Measured in pounds)  Pain Report: - none  + mild    ++ moderate    +++ severe     Measured in pounds Left Right   Trial 1 104 34      Assessment & Plan   CLINICAL IMPRESSIONS  Medical Diagnosis: Other closed intra-articular fracture of distal end of right radius, initial encounter    Treatment  Diagnosis: R wrist pain, ROM deficits, weakness    Impression/Assessment: Pt is a 44 year old male presenting to Occupational Therapy due to R wrist pain, ROM deficits, weakness.  The following significant findings have been identified: Impaired activity tolerance, Impaired coordination, Impaired ROM, Impaired strength, and Pain.  These identified deficits interfere with their ability to perform self care tasks, work tasks, recreational activities, household chores, and meal planning and preparation as compared to previous level of function.     Clinical Decision Making (Complexity):  Assessment of Occupational Performance: 5 or more Performance Deficits  Occupational Performance Limitations: bathing/showering, dressing, health management and maintenance, home establishment and management, meal preparation and cleanup, work, and leisure activities  Clinical Decision Making (Complexity): Low complexity    PLAN OF CARE  Treatment Interventions:  Modalities:  US, Iontophoresis, Fluidotherapy, and Paraffin  Therapeutic Exercise:  AROM, AAROM, PROM, Tendon Gliding, Blocking, Extensor Tracking, Isotonics, Isometrics, and Stabilization  Neuromuscular re-education:  Proprioceptive Training, Kinesiotaping, Isometrics, and Stabilization  Manual Techniques:  Joint mobilization, Friction massage, and Myofascial release  Orthotic Fabrication:  As indicated  Self Care:  Self Care Tasks and Work Tasks    Long Term Goals   OT Goal 1  Goal Identifier: Health Management  Goal Description: Popeye will be able to complete 25# biceps curl x10 reps with no pain for return to baseline engagement in health management tasks to support IADL engagement  Target Date: 10/15/25      Frequency of Treatment: 2x/month  Duration of Treatment: 12 weeks     Recommended Referrals to Other Professionals:   Education Assessment: Learner/Method: Patient;No Barriers to Learning     Risks and benefits of evaluation/treatment have been explained.    Patient/Family/caregiver agrees with Plan of Care.     Evaluation Time:    OT Eval, Low Complexity Minutes (34655): 15       Signing Clinician: SKYLAR BARROW

## 2025-07-23 ENCOUNTER — THERAPY VISIT (OUTPATIENT)
Dept: OCCUPATIONAL THERAPY | Facility: CLINIC | Age: 45
End: 2025-07-23
Attending: STUDENT IN AN ORGANIZED HEALTH CARE EDUCATION/TRAINING PROGRAM
Payer: COMMERCIAL

## 2025-07-23 DIAGNOSIS — S52.571A OTHER CLOSED INTRA-ARTICULAR FRACTURE OF DISTAL END OF RIGHT RADIUS, INITIAL ENCOUNTER: ICD-10-CM

## 2025-07-23 PROCEDURE — 97165 OT EVAL LOW COMPLEX 30 MIN: CPT | Mod: GO | Performed by: SPECIALIST/TECHNOLOGIST

## 2025-07-23 PROCEDURE — 97110 THERAPEUTIC EXERCISES: CPT | Mod: GO | Performed by: SPECIALIST/TECHNOLOGIST

## 2025-07-23 PROCEDURE — 97535 SELF CARE MNGMENT TRAINING: CPT | Mod: GO | Performed by: SPECIALIST/TECHNOLOGIST

## 2025-08-06 ENCOUNTER — THERAPY VISIT (OUTPATIENT)
Dept: OCCUPATIONAL THERAPY | Facility: CLINIC | Age: 45
End: 2025-08-06
Payer: COMMERCIAL

## 2025-08-06 DIAGNOSIS — S52.571A OTHER CLOSED INTRA-ARTICULAR FRACTURE OF DISTAL END OF RIGHT RADIUS, INITIAL ENCOUNTER: Primary | ICD-10-CM

## 2025-08-06 PROCEDURE — 97110 THERAPEUTIC EXERCISES: CPT | Mod: GO | Performed by: SPECIALIST/TECHNOLOGIST

## 2025-08-20 ENCOUNTER — THERAPY VISIT (OUTPATIENT)
Dept: OCCUPATIONAL THERAPY | Facility: CLINIC | Age: 45
End: 2025-08-20
Payer: COMMERCIAL

## 2025-08-20 DIAGNOSIS — S52.571A OTHER CLOSED INTRA-ARTICULAR FRACTURE OF DISTAL END OF RIGHT RADIUS, INITIAL ENCOUNTER: Primary | ICD-10-CM

## 2025-08-20 PROCEDURE — 97110 THERAPEUTIC EXERCISES: CPT | Mod: GO | Performed by: SPECIALIST/TECHNOLOGIST
